# Patient Record
Sex: FEMALE | Race: WHITE | NOT HISPANIC OR LATINO | Employment: OTHER | URBAN - METROPOLITAN AREA
[De-identification: names, ages, dates, MRNs, and addresses within clinical notes are randomized per-mention and may not be internally consistent; named-entity substitution may affect disease eponyms.]

---

## 2017-08-15 ENCOUNTER — OFFICE VISIT (OUTPATIENT)
Dept: PHARMACY | Facility: CLINIC | Age: 82
End: 2017-08-15

## 2017-08-15 ENCOUNTER — OFFICE VISIT (OUTPATIENT)
Dept: FAMILY MEDICINE | Facility: CLINIC | Age: 82
End: 2017-08-15

## 2017-08-15 VITALS
BODY MASS INDEX: 23.25 KG/M2 | WEIGHT: 120 LBS | HEART RATE: 79 BPM | SYSTOLIC BLOOD PRESSURE: 120 MMHG | RESPIRATION RATE: 18 BRPM | TEMPERATURE: 97.4 F | OXYGEN SATURATION: 98 % | DIASTOLIC BLOOD PRESSURE: 78 MMHG

## 2017-08-15 DIAGNOSIS — I10 ESSENTIAL HYPERTENSION WITH GOAL BLOOD PRESSURE LESS THAN 140/90: ICD-10-CM

## 2017-08-15 DIAGNOSIS — I10 ESSENTIAL HYPERTENSION WITH GOAL BLOOD PRESSURE LESS THAN 140/90: Primary | ICD-10-CM

## 2017-08-15 DIAGNOSIS — M54.50 CHRONIC MIDLINE LOW BACK PAIN WITHOUT SCIATICA: ICD-10-CM

## 2017-08-15 DIAGNOSIS — R25.2 CRAMP OF LIMB: ICD-10-CM

## 2017-08-15 DIAGNOSIS — G89.29 CHRONIC MIDLINE LOW BACK PAIN WITHOUT SCIATICA: ICD-10-CM

## 2017-08-15 DIAGNOSIS — Z00.00 MEDICARE ANNUAL WELLNESS VISIT, INITIAL: ICD-10-CM

## 2017-08-15 DIAGNOSIS — I71.40 ABDOMINAL AORTIC ANEURYSM (AAA) WITHOUT RUPTURE (H): ICD-10-CM

## 2017-08-15 DIAGNOSIS — Z00.00 PREVENTATIVE HEALTH CARE: ICD-10-CM

## 2017-08-15 DIAGNOSIS — M51.369 DDD (DEGENERATIVE DISC DISEASE), LUMBAR: Primary | ICD-10-CM

## 2017-08-15 PROBLEM — Z86.19 HISTORY OF SHINGLES: Status: ACTIVE | Noted: 2017-08-15

## 2017-08-15 LAB
ALBUMIN SERPL-MCNC: 4.3 MG/DL (ref 3.5–4.7)
ALP SERPL-CCNC: 105.7 U/L (ref 31.7–110.5)
ALT SERPL-CCNC: 19.7 U/L (ref 0–45)
AST SERPL-CCNC: 24.9 U/L (ref 0–45)
BILIRUB SERPL-MCNC: 0.6 MG/DL (ref 0.2–1.3)
BUN SERPL-MCNC: 22.2 MG/DL (ref 7–19)
CALCIUM SERPL-MCNC: 10 MG/DL (ref 8.5–10.1)
CHLORIDE SERPLBLD-SCNC: 102.5 MMOL/L (ref 98–110)
CHOLEST SERPL-MCNC: 212.6 MG/DL (ref 0–200)
CHOLEST/HDLC SERPL: 4.2 {RATIO} (ref 0–5)
CK SERPL-CCNC: 73 U/L (ref 30–225)
CO2 SERPL-SCNC: 29.4 MMOL/L (ref 20–32)
CREAT SERPL-MCNC: 0.7 MG/DL (ref 0.5–1)
CREAT UR-MCNC: 50 MG/DL
FERRITIN SERPL-MCNC: 144 NG/ML (ref 8–252)
GFR SERPL CREATININE-BSD FRML MDRD: 85.6 ML/MIN/1.7 M2
GLUCOSE SERPL-MCNC: 104 MG'DL (ref 70–99)
HCT VFR BLD AUTO: 47.9 % (ref 35–47)
HDLC SERPL-MCNC: 51.1 MG/DL
HEMOGLOBIN: 15.3 G/DL (ref 11.7–15.7)
LDLC SERPL CALC-MCNC: 137 MG/DL (ref 0–129)
MCH RBC QN AUTO: 32.2 PG (ref 26.5–35)
MCHC RBC AUTO-ENTMCNC: 31.9 G/DL (ref 32–36)
MCV RBC AUTO: 100.8 FL (ref 78–100)
MICROALBUMIN UR-MCNC: 27 MG/L
MICROALBUMIN/CREAT UR: 53.8 MG/G CR (ref 0–25)
PLATELET # BLD AUTO: 249 K/UL (ref 150–450)
POTASSIUM SERPL-SCNC: 4.3 MMOL/DL (ref 3.3–4.5)
PROT SERPL-MCNC: 7.4 G/DL (ref 6.8–8.8)
RBC # BLD AUTO: 4.75 M/UL (ref 3.8–5.2)
SODIUM SERPL-SCNC: 138.4 MMOL/L (ref 132.6–141.4)
TRIGL SERPL-MCNC: 121.3 MG/DL (ref 0–150)
TSH SERPL DL<=0.005 MIU/L-ACNC: 2.08 MU/L (ref 0.4–4)
VLDL CHOLESTEROL: 24.3 MG/DL (ref 7–32)
WBC # BLD AUTO: 6.8 K/UL (ref 4–11)

## 2017-08-15 RX ORDER — AMLODIPINE BESYLATE 5 MG/1
5 TABLET ORAL DAILY
Qty: 90 TABLET | Refills: 2 | Status: SHIPPED | OUTPATIENT
Start: 2017-08-15 | End: 2018-05-14

## 2017-08-15 RX ORDER — LIDOCAINE 40 MG/G
CREAM TOPICAL
Qty: 1 TUBE | Refills: 0 | Status: SHIPPED | OUTPATIENT
Start: 2017-08-15 | End: 2018-08-29

## 2017-08-15 RX ORDER — METOPROLOL SUCCINATE 100 MG/1
100 TABLET, EXTENDED RELEASE ORAL DAILY
Qty: 90 TABLET | Refills: 2 | Status: SHIPPED | OUTPATIENT
Start: 2017-08-15 | End: 2018-05-14

## 2017-08-15 RX ORDER — LOSARTAN POTASSIUM 50 MG/1
50 TABLET ORAL DAILY
Qty: 90 TABLET | Refills: 2 | Status: SHIPPED | OUTPATIENT
Start: 2017-08-15 | End: 2018-05-14

## 2017-08-15 NOTE — PATIENT INSTRUCTIONS
Here is the plan from today's visit: Medicare annual wellness visit, initial    1. DDD (degenerative disc disease), lumbar,  Chronic midline low back pain without sciatica    - lidocaine (LMX4) 4 % CREA cream; Apply to the affected area topically as needed, 30 minutes before the upcoming procedure      2. Essential hypertension with goal blood pressure less than 140/90    - Albumin Random Urine Quantitative  - amLODIPine (NORVASC) 5 MG tablet; Take 1 tablet (5 mg) by mouth daily - OFFER BPGAP  Dispense:   - metoprolol (TOPROL-XL) 100 MG 24 hr tablet; Take 1 tablet (100 mg) by mouth daily - OFFER BPGAP   - losartan (COZAAR) 50 MG tablet; Take 1 tablet (50 mg) by mouth daily - OFFER BPGAP      3. Cramp of limb    - Comprehensive Metabolic Panel ([facility])  - CK total  - TSH with free T4 reflex  - CBC with Plt ([facility])  - Ferritin  - Lipid Panel ([facility])  - Vitamin D Level    4. Abdominal aortic aneurysm (AAA) without rupture (H)    - US Abdominal Aorta Limited; Future      Please call or return to clinic if your symptoms of rash don't go away.    Follow up plan  Please make a clinic appointment for follow up with your primary physician Marybeth Almazan in 1 Year for annual medicare visit.    Thank you for coming to [facility] today.  Lab Testing:  **If you had lab testing today and your results are reassuring or normal they will be mailed to you or sent through [email or web address] within 7 days.   **If the lab tests need quick action we will call you with the results.  The phone number we will call with results is # [phone] (home) . If this is not the best number please call our clinic and change the number.  Medication Refills:  If you need any refills please call your pharmacy and they will contact us.   If you need to  your refill at a new pharmacy, please contact the new pharmacy directly. The new pharmacy will help you get your medications transferred faster.   Scheduling:  If you have any concerns about today's  visit or wish to schedule another appointment please call our office during normal business hours [phone] (8-5:00 M-F)  If a referral was made to a [facility] and you don't get a call from central scheduling please call [phone].  If a Mammogram was ordered for you at [facility] call [phone] to schedule or change your appointment.  If you had an XRay/CT/Ultrasound/MRI ordered the number is [phone] to schedule or change your radiology appointment.   Medical Concerns:  If you have urgent medical concerns please call [phone] at any time of the day.  If you have a medical emergency please call [phone].        [facility] MEDICARE PERSONAL PREVENTIVE SERVICES PLAN - IMMUNIZATIONS     Here are your recommended immunizations.  Take this home for your reference.                                                    IMMUNIZATIONS Description Recommend today?     Influenza (Flu shot) Prevents flu; should get every year No: is not indicated today.   PCV 13 Pneumonia vaccination; you get it once No; is up to date.   PPSV 23 Second pneumonia vaccination; usually get it 1 year after PCV 13 No; is up to date.   Zoster (Shingles) Prevents shingles; you get it once Yes; Recommended and ordered.   Tetanus Prevents tetanus; once every 10 years No; is up to date.

## 2017-08-15 NOTE — PROGRESS NOTES
Subsequent Medicare Wellness Visit         HPI         This 81 year old female presents as an established patient  Marybeth Almazan who presents for a SUbsequent Medicare Annual Wellness Exam.    Other issues patient wants to be addressed today:    yes, rash from lidoderm patch        Patient Active Problem List   Diagnosis     DDD (degenerative disc disease), lumbar     Diverticulosis of colon     Advanced directives, counseling/discussion     Abdominal aortic aneurysm (H)     Hyperlipidemia LDL goal <130     Tobacco abuse     Calculus of kidney     Seborrheic keratosis     Rotator cuff strain     Right shoulder pain     Essential hypertension with goal blood pressure less than 140/90     Osteopenia     Balance problems       Past Medical History:   Diagnosis Date     Hypertension      NO ACTIVE PROBLEMS         Family History   Problem Relation Age of Onset     CEREBROVASCULAR DISEASE Mother      Breast Cancer Mother      Hypertension Mother      Arthritis Mother      Eye Disorder Mother      Thyroid Disease Mother      Genitourinary Problems Father      kidney     DIABETES Father      CANCER Father          Problem List, Family History and past Medical History reviewed and unchanged/updated.       Health Risk Assessment / Review of Systems       Constitutional:   Fevers or night sweats?  NO      Eyes:   Vision problems?  NO            Hearing:  Do you feel you have hearing loss?  NO  Cardiovascular:  Chest pain, palpitations, or pain with walking?  NO        Respiratory:   Breathing problems or cough?  NO    :   Difficulty controlling urination?  NO        Musculoskeletal:   Stiffness or sore joints?  NO            Skin:   concerning lesions or moles?  NO           Nervous System:   loss of strength or sensation,  numbness or tingling,  tremor,  dizziness,  headache?  NO         Mental Health:   depression or anxiety,  sleep problems?  NO   Cognition:  Memory problems?  NO           PHQ-2 Score:     PHQ-2 ( 1999  [employer]) 11/1/2016 6/15/2016   Q1: Little interest or pleasure in doing things 0 0   Q2: Feeling down, depressed or hopeless 0 0   PHQ-2 Score 0 0       PHQ-9 Score:     No flowsheet data found.         Medical Care     What other specialists or organizations are involved in your medical care?  None  Patient Care Team       Relationship Specialty Notifications Start End    Marybeth Almazan MD PCP - General Family Practice  11/1/16     Phone: [phone] Fax: [phone]         [address] [place] [address]    Marybeth Almazan MD MD Family Practice  11/2/16     Comment:  referred to PT for balance    Phone: [phone] Fax: [phone]         [address] [address]                 Social History / Home Safety     Social History   Substance Use Topics     Smoking status: Current Every Day Smoker     Packs/day: 0.25     Years: 50.00     Types: Cigarettes     Smokeless tobacco: Never Used     Alcohol use No     Marital Status:  Who lives in your household? alone  Do you feel threatened or controlled by a partner, ex-partner or anyone in your life? No   Has anyone hurt you physically, for example by pushing, hitting, slapping or kicking you   or forcing you to have sex? No          Functional Status     Do you need help with dressing yourself, bathing, or walking?No     Do you need help with the phone, transportation, shopping, preparing meals, housework, laundry, medications or managing money?No       Risk Behaviors and Healthy Habits     History   Smoking Status     Current Every Day Smoker     Packs/day: 0.25     Years: 50.00     Types: Cigarettes   Smokeless Tobacco     Never Used     How many servings of fruits and vegetables do you eat a day? 1-2  Exercise:walking  6-7 days/week for an average of 45-60 minutes  Do you frequently drive without a seatbelt? No   Do you use any other drugs? No       Do you use alcohol?No      Functional Ability     FALL RISK  "ASSESSMENT 6/15/2016 6/5/2015 4/9/2014 3/29/2013   Fallen 2 or more times in the past year? - 2. No 2. No 2. No   Any fall with injury in the past year? - 2. No 2. No 2. No   Get Up and Go Test/Seconds (>13.5 is fall risk; contact physician) - - 0 -   Medical Reason(s) for Not Completing Screen: - - none -   Fallen 2 or more times in the past year? No - - -   Any fall with injury in the past year? No - - -       Frailty screen score (3-5 = frail; consider interdisciplinary assessment and care.  1-2 = prefrail; at risk for adverse health events; 0 = robust):        EVALUATION OF COGNITIVE FUNCTION     Mood/affect:Normal  Appearance:Normal  Family member/caregiver input: Normal    Mini Cog Scoring   2 points   Clock Draw Test result:  Normal   Cognitive screen is:Negative          Other Assessments     CV Risk based on Pooled Cohort Risk (consider assessing every 4-6 years; consider statin in patients with 10-yr risk > 7.5%): {may use \"SMIASCVD\" if guideline information required}  The ASCVD Risk score (Azambrittney MONTGOMERY Jr, et al., 2013) failed to calculate for the following reasons:    The 2013 ASCVD risk score is only valid for ages 40 to 79    Advance Directives: Discussed with patient and family as appropriate.  Has patient completed advance directives and/or a living will?  yes   {Use section \"Code/ACP Activity\" under More activities for Advance directive information-use  for code status}  Immunization History   Administered Date(s) Administered     Influenza (IIV3) 11/18/2008, 09/27/2012     Influenza Vaccine IM 3yrs+ 4 Valent IIV4 09/01/2013     Pneumococcal (PCV 13) 12/04/2015     Pneumococcal 23 valent 10/15/1999, 11/14/2001     Poliovirus, inactivated (IPV) 02/02/2012     TD (ADULT, 7+) 11/18/2008     Twinrix A/B 02/02/2012, 03/05/2012     Typhoid IM 02/02/2012     Reviewed Immunization Record Today         Physical Exam     Vitals: /78  Pulse 79  Temp 97.4  F (36.3  C) (Oral)  Resp 18  Wt 120 lb " "(54.4 kg)  SpO2 98%  Breastfeeding? No  BMI 23.25 kg/m2  BMI= Body mass index is 23.25 kg/(m^2).  GENERAL APPEARANCE: alert and no distress  HENT: ear canals patent and TM's normal; mouth without ulcers or lesions; and oral mucous membranes moist  DENTITION:  Good repair?  yes  RESP: lungs clear to auscultation - no rales, rhonchi or wheezes  CV: regular rates and rhythm, no murmur, click or rub, no peripheral edema and peripheral pulses strong  SKIN: no suspicious lesions or rashes  NEURO: Normal strength and tone  {use \".PHYEXAMBYAGE\" FOR FULL NOTEWRITER EXAM}        Assessment and Plan   {Go to orders and enter \"Medicare Annual Wellness Visit, Subsequent\" for visit diagnosis and order \"Medical Wellness Panel\". Check/ uncheck all appropriate orders}  {Go to billing and bill code .  If you addressed other concerns you can also bill an E&M code for those services}    Subsequent Medicare Annual Wellness Exam - reviewed Preventive Services and Plan form with patient as specified in Patient Instructions.    {Go to Instructions, type in dot phrase \".SMIAVSMEDICAREMALE\" for male pt or \".SMIAVSMEDICAREFEMALE\" for female pt and complete Personal Prevention Plan with patient}      Options for treatment and follow-up care were reviewed with the Isabell Cisse and/or guardian engaged in the decision making process and verbalized understanding of the options discussed and agreed with the final plan.    Marybeth Almazan MD  "

## 2017-08-15 NOTE — LETTER
August 18, 2017      Isabell Cisse  [address]  [facility] [address]        Dear Isabell,    Thank you for getting your care at [facility]. Please see below for your test results.  There is nothing in these results that explains why you might be having leg cramps at times. Please increase your water intake and come back to see me if this problem continues.     The only other thing we might consider is the addition of a cholesterol lowering medication, but as I recall you weren't interested in this. I'll bring it up again when we see each other again!    Resulted Orders   Comprehensive Metabolic Panel ([facility])   Result Value Ref Range    Albumin 4.3 3.5 - 4.7 mg/dL    Alkaline Phosphatase 105.7 31.7 - 110.5 U/L    ALT 19.7 0.0 - 45.0 U/L    AST 24.9 0.0 - 45.0 U/L    Bilirubin Total 0.6 0.2 - 1.3 mg/dL    Urea Nitrogen 22.2 (H) 7.0 - 19.0 mg/dL    Calcium 10.0 8.5 - 10.1 mg/dL    Chloride 102.5 98.0 - 110.0 mmol/L    Carbon Dioxide 29.4 20.0 - 32.0 mmol/L    Creatinine 0.7 0.5 - 1.0 mg/dL    Glucose 104.0 (H) 70.0 - 99.0 mg'dL    Potassium 4.3 3.3 - 4.5 mmol/dL    Sodium 138.4 132.6 - 141.4 mmol/L    Protein Total 7.4 6.8 - 8.8 g/dL    GFR Estimate 85.6 >60.0 mL/min/1.7 m2    GFR Estimate If Black >90 >60.0 mL/min/1.7 m2   CK total   Result Value Ref Range    CK Total 73 30 - 225 U/L   TSH with free T4 reflex   Result Value Ref Range    TSH 2.08 0.40 - 4.00 mU/L   CBC with Plt ([facility])   Result Value Ref Range    WBC 6.8 4.0 - 11.0 K/uL    RBC 4.75 3.80 - 5.20 M/uL    Hemoglobin 15.3 11.7 - 15.7 g/dL    Hematocrit 47.9 (H) 35.0 - 47.0 %    .8 (H) 78.0 - 100.0 fL    MCH 32.2 26.5 - 35.0 pg    MCHC 31.9 (L) 32.0 - 36.0 g/dL    Platelets 249.0 150.0 - 450.0 K/uL   Ferritin   Result Value Ref Range    Ferritin 144 8 - 252 ng/mL   Lipid Panel ([facility])   Result Value Ref Range    Cholesterol 212.6 (H) 0.0 - 200.0 mg/dL    Cholesterol/HDL Ratio 4.2 0.0 - 5.0    HDL Cholesterol 51.1 >40.0 mg/dL    LDL  Cholesterol Calculated 137 (H) 0 - 129 mg/dL    Triglycerides 121.3 0.0 - 150.0 mg/dL    VLDL Cholesterol 24.3 7.0 - 32.0 mg/dL   Albumin Random Urine Quantitative   Result Value Ref Range    Creatinine Urine 50 mg/dL    Albumin Urine mg/L 27 mg/L    Albumin Urine mg/g Cr 53.80 (H) 0 - 25 mg/g Cr   Vitamin D Level   Result Value Ref Range    Vitamin D Deficiency screening 47 20 - 75 ug/L      Comment:      Season, race, dietary intake, and treatment affect the concentration of   25-hydroxy-Vitamin D. Values may decrease during winter months and increase   during summer months. Values 20-29 ug/L may indicate Vitamin D insufficiency   and values <20 ug/L may indicate Vitamin D deficiency.  Vitamin D determination is routinely performed by an immunoassay specific for   25 hydroxyvitamin D3.  If an individual is on vitamin D2 (ergocalciferol)   supplementation, please specify 25 OH vitamin D2 and D3 level determination by   LCMSMS test [id number].         If you have any concerns about these results please call and leave a message for me or send a MyChart message to the clinic.    Sincerely,    Marybeth Almazan MD

## 2017-08-15 NOTE — PROGRESS NOTES
Initial Medicare Wellness Visit         HPI       This 81 year old female presents as an established patient  Marybeth Almazan who presents for a Welcome to Medicare Preventive Visit / Initial Medicare Annual Wellness Exam.    Other issues patient wants to be addressed today:    yes, rash from OTC pain med patch she uses on her lower back and upper R arm. Has always had sensitive skin. Would like another topical option. Had TENS unit for back in past that was too irritating as well.      Visual Acuity:  Right Eye: 20/30   Left Eye: 20/30  Both Eyes: 20/20    Patient Active Problem List   Diagnosis     DDD (degenerative disc disease), lumbar     Diverticulosis of colon     Advanced directives, counseling/discussion     Abdominal aortic aneurysm (H)     Hyperlipidemia LDL goal <130     Tobacco abuse     Calculus of kidney     Seborrheic keratosis     Rotator cuff strain     Right shoulder pain     Essential hypertension with goal blood pressure less than 140/90     Osteopenia     Balance problems       Past Medical History:   Diagnosis Date     Hypertension      NO ACTIVE PROBLEMS         Family History   Problem Relation Age of Onset     CEREBROVASCULAR DISEASE Mother      Breast Cancer Mother      Hypertension Mother      Arthritis Mother      Eye Disorder Mother      Thyroid Disease Mother      Genitourinary Problems Father      kidney     DIABETES Father      CANCER Father          Problem List, Family History and past Medical History reviewed and unchanged/updated.       Health Risk Assessment / Review of Systems       Constitutional:   Fevers or night sweats?  NO      Eyes:   Vision problems?  NO            Hearing:  Do you feel you have hearing loss?  NO, but sometimes have to ask others to repeat themselves  Cardiovascular:  Chest pain, palpitations, or pain with walking?  NO        Respiratory:   Breathing problems or cough?  NO    :   Difficulty controlling urination?  NO        Musculoskeletal:   Stiffness  or sore joints?  NO            Skin:   concerning lesions or moles?  YES, have rash on back and right bicep      Nervous System:   loss of strength or sensation,  numbness or tingling,  tremor,  dizziness,  headache?  NO         Mental Health:   depression or anxiety,  sleep problems?  NO   Cognition:  Memory problems?  NO           PHQ-2 Score:     PHQ-2 ( 1999 Pfizer) 8/15/2017 11/1/2016   Q1: Little interest or pleasure in doing things 0 0   Q2: Feeling down, depressed or hopeless 0 0   PHQ-2 Score 0 0       PHQ-9 Score: not done    No flowsheet data found.         Medical Care     What other specialists or organizations are involved in your medical care?  none  Patient Care Team       Relationship Specialty Notifications Start End    Marybeth Almazan MD PCP - General Family Practice  11/1/16     Phone: [phone] Fax: [phone]         [address] [place] [id number]    Marybeth Almazan MD [facility]  11/2/16     Comment:  referred to PT for balance    Phone: [phone] Fax: [phone]         [address] [place] [address]                 Social History / Home Safety     Social History   Substance Use Topics     Smoking status: Current Every Day Smoker     Packs/day: 0.25     Years: 50.00     Types: Cigarettes     Smokeless tobacco: Never Used     Alcohol use No     Marital Status:  Who lives in your household? No one  Does your home have any of the following safety concerns? Loose rugs in the hallway, no grab bars in the bathroom, no handrails on the stairs or have poorly lit areas?  No   Do you feel threatened or controlled by a partner, ex-partner or anyone in your life? {Yes No   Has anyone hurt you physically, for example by pushing, hitting, slapping or kicking you   or forcing you to have sex? No          Functional Status     Do you need help with dressing yourself, bathing, or walking?No     Do you need help with the phone, transportation,  shopping, preparing meals, housework, laundry, medications or managing money?No       Risk Behaviors and Healthy Habits     History   Smoking Status     Current Every Day Smoker     Packs/day: 0.25     Years: 50.00     Types: Cigarettes   Smokeless Tobacco     Never Used     How many servings of fruits and vegetables do you eat a day? 1-2  Exercise: walking  6-7 days/week for an average of greater than 60 minutes  Do you frequently drive without a seatbelt? No   Do you use any other drugs? No       Do you use alcohol?No      Functional Ability     Was the patient's timed Up & Go test  unsteady or longer than 30 seconds? No     Fall risk:     1. Have you fallen two or more times in the past year? No   2. Have you fallen and had an injury in the past year?  No     FALL RISK ASSESSMENT 8/15/2017 6/15/2016 6/5/2015 4/9/2014 3/29/2013   Fallen 2 or more times in the past year? - - 2. No 2. No 2. No   Any fall with injury in the past year? - - 2. No 2. No 2. No   Get Up and Go Test/Seconds (>13.5 is fall risk; contact physician) - - - 0 -   Medical Reason(s) for Not Completing Screen: - - - none -   Fallen 2 or more times in the past year? No No - - -   Any fall with injury in the past year? No No - - -       Hearing loss screen:   Negative       Frailty screen score (3-5 = frail; consider interdisciplinary assessment and care.  1-2 = prefrail; at risk for adverse health events; 0 = robust):        EVALUATION OF COGNITIVE FUNCTION     Mood/affect:Normal  Appearance:Normal  Family member/caregiver input: Normal    Mini Cog Scoring   3 points   Clock Draw Test result:  Normal   Cognitive screen is: Negative          Other Assessments     CV Risk based on Pooled Cohort Risk (consider assessing every 4-6 years; consider statin in patients with 10-yr risk > 7.5%):   The ASCVD Risk score (Glennvillebrittney MONTGOMERY Jr, et al., 2013) failed to calculate for the following reasons:    The 2013 ASCVD risk score is only valid for ages 40 to  79      Advance Directives: Discussed with patient and family as appropriate.  Has patient completed advance directives and/or a living will?  yes     Immunization History   Administered Date(s) Administered     Influenza (IIV3) 11/18/2008, 09/27/2012     Influenza Vaccine IM 3yrs+ 4 Valent IIV4 09/01/2013     Pneumococcal (PCV 13) 12/04/2015     Pneumococcal 23 valent 10/15/1999, 11/14/2001     Poliovirus, inactivated (IPV) 02/02/2012     TD (ADULT, 7+) 11/18/2008     Twinrix A/B 02/02/2012, 03/05/2012     Typhoid IM 02/02/2012     Reviewed Immunization Record Today         Physical Exam     Vitals: /78  Pulse 79  Temp 97.4  F (36.3  C) (Oral)  Resp 18  Wt 120 lb (54.4 kg)  SpO2 98%  Breastfeeding? No  BMI 23.25 kg/m2  BMI= Body mass index is 23.25 kg/(m^2).         Physical Exam   Constitutional: She appears well-developed and well-nourished. No distress.   HENT:   Right Ear: External ear normal.   Left Ear: External ear normal.   Mouth/Throat: Oropharynx is clear and moist.   Full upper and partial lower dentures   Eyes: Conjunctivae and EOM are normal. Pupils are equal, round, and reactive to light.   Neck: Normal range of motion. Neck supple. No JVD present. No thyromegaly present.   No carotid bruits   Cardiovascular: Normal rate, regular rhythm and normal heart sounds.  Exam reveals no gallop and no friction rub.    No murmur heard.  Pulmonary/Chest: Effort normal and breath sounds normal. No respiratory distress. She has no wheezes. She has no rales.   Abdominal: Soft. Bowel sounds are normal. She exhibits abdominal bruit. She exhibits no distension and no mass. There is no hepatosplenomegaly. There is no tenderness. There is no rebound.       Musculoskeletal: She exhibits no edema.        Lumbar back: She exhibits decreased range of motion and tenderness. She exhibits no swelling and no spasm.   Lymphadenopathy:     She has no cervical adenopathy.             Assessment and Plan     Welcome to  Medicare Preventive Visit / Initial Medicare Annual Wellness Exam - reviewed Preventive Services and Plan form with patient as specified in Patient Instructions.    1. DDD (degenerative disc disease), lumbar,  Chronic midline low back pain without sciatica  - lidocaine (LMX4) 4 % CREA cream; Apply to the affected area topically as needed, 30 minutes before the upcoming procedure      2. Essential hypertension with goal blood pressure less than 140/90  - Albumin Random Urine Quantitative  - amLODIPine (NORVASC) 5 MG tablet; Take 1 tablet (5 mg) by mouth daily - OFFER BPGAP  Dispense:   - metoprolol (TOPROL-XL) 100 MG 24 hr tablet; Take 1 tablet (100 mg) by mouth daily - OFFER BPGAP   - losartan (COZAAR) 50 MG tablet; Take 1 tablet (50 mg) by mouth daily - OFFER BPGAP      3. Cramp of limb  - Comprehensive Metabolic Panel ([facility])  - CK total  - TSH with free T4 reflex  - CBC with Plt ([facility]Q)  - Ferritin  - Lipid Panel ([facility])  - Vitamin D Level    4. Abdominal aortic aneurysm (AAA) without rupture (H). Follow up imaging  - US Abdominal Aorta Limited; Future    5. Osteopenia  -  Continue Ca + D    [facility] MEDICARE PERSONAL PREVENTIVE SERVICES PLAN - IMMUNIZATIONS     Here are your recommended immunizations.  Take this home for your reference.                                                    IMMUNIZATIONS Description Recommend today?     Influenza (Flu shot) Prevents flu; should get every year No: is not indicated today.   PCV 13 Pneumonia vaccination; you get it once No; is up to date.   PPSV 23 Second pneumonia vaccination; usually get it 1 year after PCV 13 No; is up to date.   Zoster (Shingles) Prevents shingles; you get it once Yes; Recommended and ordered.   Tetanus Prevents tetanus; once every 10 years No; is up to date.       Options for treatment and follow-up care were reviewed with the Isabell Cisse and/or guardian engaged in the decision making process and verbalized understanding of the options  discussed and agreed with the final plan.    Marybeth Almazan MD

## 2017-08-15 NOTE — MR AVS SNAPSHOT
After Visit Summary   8/15/2017    Isabell Cisse    MRN: [id number]           Patient Information     Date Of Birth          1935        Visit Information        Provider Department      8/15/2017 8:40 AM Marybeth Almazan MD [facility]        Today's Diagnoses     DDD (degenerative disc disease), lumbar    -  1    Medicare annual wellness visit, initial        Essential hypertension with goal blood pressure less than 140/90        Cramp of limb        Abdominal aortic aneurysm (AAA) without rupture (H)        Chronic midline low back pain without sciatica          Care Instructions    Here is the plan from today's visit: Medicare annual wellness visit, initial    1. DDD (degenerative disc disease), lumbar,  Chronic midline low back pain without sciatica    - lidocaine (LMX4) 4 % CREA cream; Apply to the affected area topically as needed, 30 minutes before the upcoming procedure      2. Essential hypertension with goal blood pressure less than 140/90    - Albumin Random Urine Quantitative  - amLODIPine (NORVASC) 5 MG tablet; Take 1 tablet (5 mg) by mouth daily - OFFER BPGAP  Dispense:   - metoprolol (TOPROL-XL) 100 MG 24 hr tablet; Take 1 tablet (100 mg) by mouth daily - OFFER BPGAP   - losartan (COZAAR) 50 MG tablet; Take 1 tablet (50 mg) by mouth daily - OFFER BPGAP      3. Cramp of limb    - Comprehensive Metabolic Panel (LabDAQ)  - CK total  - TSH with free T4 reflex  - CBC with Plt (LabDAQ)  - Ferritin  - Lipid Panel (LabDAQ)  - Vitamin D Level    4. Abdominal aortic aneurysm (AAA) without rupture (H)    - US Abdominal Aorta Limited; Future      Please call or return to clinic if your symptoms of rash don't go away.    Follow up plan  Please make a clinic appointment for follow up with your primary physician Marybeth Almazan in 1 Year for annual medicare visit.    Thank you for coming to [facility] today.  Lab Testing:  **If you had lab testing today and your results  are reassuring or normal they will be mailed to you or sent through [employer] within 7 days.   **If the lab tests need quick action we will call you with the results.  The phone number we will call with results is # [phone] (home) . If this is not the best number please call our clinic and change the number.  Medication Refills:  If you need any refills please call your pharmacy and they will contact us.   If you need to  your refill at a new pharmacy, please contact the new pharmacy directly. The new pharmacy will help you get your medications transferred faster.   Scheduling:  If you have any concerns about today's visit or wish to schedule another appointment please call our office during normal business hours [phone] (8-5:00 M-F)  If a referral was made to a [facility] and you don't get a call from central scheduling please call [phone].  If a Mammogram was ordered for you at [facility] call [phone] to schedule or change your appointment.  If you had an XRay/CT/Ultrasound/MRI ordered the number is [phone] to schedule or change your radiology appointment.   Medical Concerns:  If you have urgent medical concerns please call [phone] at any time of the day.  If you have a medical emergency please call 492.        [facility] MEDICARE PERSONAL PREVENTIVE SERVICES PLAN - IMMUNIZATIONS     Here are your recommended immunizations.  Take this home for your reference.                                                    IMMUNIZATIONS Description Recommend today?     Influenza (Flu shot) Prevents flu; should get every year No: is not indicated today.   PCV 13 Pneumonia vaccination; you get it once No; is up to date.   PPSV 23 Second pneumonia vaccination; usually get it 1 year after PCV 13 No; is up to date.   Zoster (Shingles) Prevents shingles; you get it once Yes; Recommended and ordered.   Tetanus Prevents tetanus; once every 10 years No; is up to date.              Follow-ups after your visit        Your next 10 appointments already scheduled     Aug 23, 2017  8:00 AM CDT   US ABDOMINAL AORTIC LIMITED with UCUS3   [facility] ([facility])    [address] [address] [phone]           Please bring a list of your medicines (including vitamins, minerals and over-the-counter drugs). Also, tell your doctor about any allergies you may have. Wear comfortable clothes and leave your valuables at home.  Adults: No eating or drinking for 8 hours before the exam. You may take medicine with a small sip of water.  Children: - Children 6+ years: No food or drink for 6 hours before exam. - Children 1-5 years: No food or drink for 4 hours before exam. - Infants, breast-fed: may have breast milk up to 2 hours before exam. - Infants, formula: may have bottle until 4 hours before exam.  Please call the Imaging Department at your exam site with any questions.              Future tests that were ordered for you today     Open Future Orders        Priority Expected Expires Ordered    US Abdominal Aorta Limited Routine  8/15/2018 8/15/2017            Who to contact     Please call your clinic at [phone] to:    Ask questions about your health    Make or cancel appointments    Discuss your medicines    Learn about your test results    Speak to your doctor   If you have compliments or concerns about an experience at your clinic, or if you wish to file a complaint, please contact [facility] Patient Relations at [phone] or email us at [email or web address]         Additional Information About Your Visit        [employer] Information     [employer] is an electronic gateway that provides easy, online access to your medical records. With [employer], you can request a clinic appointment, read your test results, renew a prescription or communicate with your care team.     To sign  up for [employer] visit the website at [email or web address]   You will be asked to enter the access code listed below, as well as some personal information. Please follow the directions to create your username and password.     Your access code is: [email or web address]  Expires: 2017  9:35 AM     Your access code will  in 90 days. If you need help or a new code, please contact your [facility] or call [phone] for assistance.        Care EveryWhere ID     This is your Care EveryWhere ID. This could be used by other organizations to access your [place] medical records  [id number]        Your Vitals Were     Pulse Temperature Respirations Pulse Oximetry Breastfeeding? BMI (Body Mass Index)    79 97.4  F (36.3  C) (Oral) 18 98% No 23.25 kg/m2       Blood Pressure from Last 3 Encounters:   08/15/17 120/78   16 130/87   16 114/58    Weight from Last 3 Encounters:   08/15/17 120 lb (54.4 kg)   16 120 lb 3.2 oz (54.5 kg)   06/15/16 117 lb (53.1 kg)              We Performed the Following     Albumin Random Urine Quantitative     CBC with Plt (LabDAQ)     CK total     Comprehensive Metabolic Panel (LabDAQ)     Ferritin     Lipid Panel (LabDAQ)     TSH with free T4 reflex     Vitamin D Level          Today's Medication Changes          These changes are accurate as of: 8/15/17  9:35 AM.  If you have any questions, ask your nurse or doctor.               Start taking these medicines.        Dose/Directions    lidocaine 4 % Crea cream   Commonly known as:  LMX4   Used for:  DDD (degenerative disc disease), lumbar   Started by:  Marybeth Almazan MD        Apply to the affected area topically as needed, 30 minutes before the upcoming procedure   Quantity:  1 Tube   Refills:  0            Where to get your medicines      These medications were sent to [facility] [address]  [address]     Phone:  [phone]     amLODIPine 5 MG tablet    lidocaine 4 % Crea cream    losartan 50 MG tablet    metoprolol 100 MG 24 hr tablet                Primary Care Provider Office Phone # Fax #    Marybeth Almazan -[phone]       [address] [facility] [address]  [facility] [address]        Equal Access to Services     [facility]: Hadii aad ku hadasho Soomaali, waaxda luqadaha, qaybta kaalmada adeegyada, waxay idiin hayaan adeeg nati lababaktien . So [facility] [phone].    ATENCIÓN: Si habla español, tiene a perrin disposición servicios gratuitos de asistencia lingüística. Ivy al [phone].    We comply with applicable federal civil rights laws and [place] laws. We do not discriminate on the basis of race, color, national origin, age, disability sex, sexual orientation or gender identity.            Thank you!     Thank you for choosing hospitals [facility]  for your care. Our goal is always to provide you with excellent care. Hearing back from our patients is one way we can continue to improve our services. Please take a few minutes to complete the written survey that you may receive in the mail after your visit with us. Thank you!             Your Updated Medication List - Protect others around you: Learn how to safely use, store and throw away your medicines at www.disposemymeds.org.          This list is accurate as of: 8/15/17  9:35 AM.  Always use your most recent med list.                   Brand Name Dispense Instructions for use Diagnosis    amLODIPine 5 MG tablet    NORVASC    90 tablet    Take 1 tablet (5 mg) by mouth daily - OFFER BPGAP    Essential hypertension with goal blood pressure less than 140/90       aspirin 81 MG tablet      Take 1 tablet by mouth Patient takes occasionally        CALCIUM 500 PO      1 tablet daily    Mammographic microcalcification, Preventative health care       lidocaine 4 % Crea cream    LMX4    1 Tube    Apply to the affected area  topically as needed, 30 minutes before the upcoming procedure    DDD (degenerative disc disease), lumbar       losartan 50 MG tablet    COZAAR    90 tablet    Take 1 tablet (50 mg) by mouth daily - OFFER BPGAP    Essential hypertension with goal blood pressure less than 140/90       MAGNESIUM PO           metoprolol 100 MG 24 hr tablet    TOPROL-XL    90 tablet    Take 1 tablet (100 mg) by mouth daily - OFFER BPGAP    Essential hypertension with goal blood pressure less than 140/90       Multi-vitamin Tabs tablet   Generic drug:  multivitamin, therapeutic with minerals      1 TABLET DAILY        VITAMIN C PO      1 tablet daily    Mammographic microcalcification, Preventative health care       VITAMIN D PO      1 tablet daily    Mammographic microcalcification, Preventative health care       vitamin E 1000 UNIT capsule    TOCOPHEROL     Take 1,000 Units by mouth daily.    Preop general physical exam       ZINC 15 PO      Patient takes occasionally    Mammographic microcalcification, Preventative health care

## 2017-08-15 NOTE — PROGRESS NOTES
Clinical Pharmacy Note     Isabell was referred by Dr. Almazan for pharmacy services for Annual Medicare Wellness Visit.      MEDICATION REVIEW:  Discussed all medication indications, dosage and effectiveness, adverse effects, and adherence with patient/caregiver.    Pt had meds with them: no  Pt had med list with them: no  Pt was knowledgeable about meds: yes, knew that she takes 3 blood pressure meds, but unsure of medication names  Medications set up by: self  Medications administered by someone else (e.g., LTCF): No  Pt uses a medication box or automated dispenser: yes    Medication Discrepancies  Medications on EMR med list that pt is NOT taking:  yes, Calcium, patient states that she had kidney stones in the past and no longer takes Calcium.  Medications pt IS taking that are NOT on EMR med list (e.g., from specialist, hospital): none  OTC meds/ dietary supplements pt taking on own that are NOT on EMR med list:  yes, occasionally Vitamin B, occasionally L-lysine when she has shingles pains on her head and it helps.  Dosage listed differently than how patient is taking: none  Frequency listed differently than how patient is taking: none  Duplicate medication on list (two occurrences of the same medication):  none  TOTAL NUMBER OF MEDICATION DISCREPANCIES:  3  ______________________________________________________________________      Subjective:  Isabell is here today with her daughter for an MTM visit.    1)   Hypertension;     Isabell states that she takes 3 medications for her blood pressure daily, but she does not know the names of the medications.  She sets up her medications in a pill box which helps her with compliance.  She states that she usually remembers, but there was one day last week when she realized the next day she forgot her dose the day before.  She has no complaints of adverse effects.    2)   Bone Health;    Isabell states that she used to take a calcium supplement, but then she has a  kidney stone so then she stopped using it.  She states that she eats lots of cheese (three times daily) and milk, but is concerned about getting too much calcium in her diet and getting kidney stones again.  She had a DEXA scan years ago, but is not interested in another scan.  Isabell states that she has never fractured or broken a bone and her mother had strong bones, too.  She states that she is not taking her vitamin D regularly and asks about this medication.    Patient reported being compliant most of the time   Patient reports no side effects.      Objective:    There were no vitals taken for this visit.  BP Readings from Last 6 Encounters:   08/15/17 120/78   11/01/16 130/87   08/27/16 114/58   06/15/16 138/79   12/04/15 166/86   06/05/15 122/79     CrCl cannot be calculated (Patient's most recent sCr result is older than the maximum 90 days allowed.).  GFR Estimate   Date Value Ref Range Status   06/07/2016 83 >60 mL/min/1.7m2 Final     Comment:     Non  GFR Calc   06/01/2015 74 >60 mL/min/1.7m2 Final     Comment:     Non  GFR Calc   04/09/2014 81 >60 mL/min/1.7m2 Final     GFR Estimate If Black   Date Value Ref Range Status   06/07/2016 >90   GFR Calc   >60 mL/min/1.7m2 Final   06/01/2015 90 >60 mL/min/1.7m2 Final     Comment:      GFR Calc   04/09/2014 >90 >60 mL/min/1.7m2 Final       Drug Therapy Assessment:  Drug therapy problems identified:     1. Essential hypertension with goal blood pressure less than 140/90       Status:  controlled       DTP:  None    Patient BP in clinic today, 120/78, meeting BP goal of <140/90 with medication regimen of metoprolol, losartan and amlodipine.  Patient is able to be compliant with medications using her pill box.  Recommend continuing current therapy.    2. Bone Health       Status:  unknown       DTP:  Safety, recommend DEXA  , DTP degree:2        Status:  uncontrolled       DTP:  patient forgets to  take/misunderstanding, initiate Vitamin D  , DTP degree:2            Patient meets National Osteoporosis Foundation recommendation for bone mineral density testing, being a female >65 years of age.  Recommend DEXA scan to assess T-score and indication for osteoporosis or osteopenia therapy.  Patient declines DEXA scan.     Patient is currently having 3-4 servings of dietary dairy calcium/day ~1000 mg plus ~250 mg in non-dairy dietary sources.  Recommend continuing dietary intake of 1200 mg calcium to meet NOF guidelines.  We discussed that evidence has associated an increased risk of nephrolithiasis in patients taking calcium supplements, but the same risk has not been seen in patients with dietary calcium intake and the incidence of stone formation may be reduced.     Patient is not taking vitamin D supplement regularly.  Recommend taking 800-1000 IU vitamin D daily to aid in calcium intestinal absorption.      All medications were reviewed and found to be indicated, effective, safe and convenient unless drug therapy problem identified as described above.        Drug Therapy Plan and Follow up:      Plan  1. Essential hypertension with goal blood pressure less than 140/90  Continue current therapy.  2. Bone Health  Continue 1200 mg dietary calcium daily.  Start taking Vitamin D 800-1000 IU daily.    Follow up: with PCP  Patient was provided with written instructions/medication list via provider AVS.        Options for treatment and/or follow-up care were reviewed with the patient. Patient was engaged and actively involved in the decision making process.  Isabell Cisse verbalized understanding of the options discussed and was satisfied with the final plan.      Dr. Almazan was provided my action  via routed note and Dr. Ibrahim was available for supervision during this visit and is the authorizing prescriber for this visit through the pharmacist collaborative practice agreement.      Vanessa Barber, Pharm.D      Total Time: 20  # DTPs Identified: 2    Medical Condition 1: Osteoporosis, Goals of therapy: Other,  ,  ,  , Safety: Lab test needed,  , Intervention: Order lab, Verification: Patient Disagreed  Medical Condition 2: Osteoporosis,  , Drug Class 2: Vitamins,  ,  ,  , Convenience 2: Patient misunderstanding, Intervention 2: Initiate drug, Educate patient, Verification 2: Patient Agreed - CPA   ,  ,  ,  ,  ,  ,  ,  ,     ,  ,  ,

## 2017-08-16 LAB — DEPRECATED CALCIDIOL+CALCIFEROL SERPL-MC: 47 UG/L (ref 20–75)

## 2017-08-29 NOTE — PROGRESS NOTES
===================    Pharmacy Attestation Statement:    Patient s case reviewed. I agree with the written assessment and plan of care.    Darinel Perry PharmD.    ===================

## 2018-04-04 ENCOUNTER — OFFICE VISIT (OUTPATIENT)
Dept: FAMILY MEDICINE | Facility: CLINIC | Age: 83
End: 2018-04-04
Payer: COMMERCIAL

## 2018-04-04 VITALS
RESPIRATION RATE: 16 BRPM | SYSTOLIC BLOOD PRESSURE: 134 MMHG | BODY MASS INDEX: 23.64 KG/M2 | TEMPERATURE: 97.7 F | DIASTOLIC BLOOD PRESSURE: 81 MMHG | WEIGHT: 122 LBS | OXYGEN SATURATION: 96 % | HEART RATE: 76 BPM

## 2018-04-04 DIAGNOSIS — L23.1 ALLERGIC CONTACT DERMATITIS DUE TO ADHESIVES: Primary | ICD-10-CM

## 2018-04-04 DIAGNOSIS — M54.50 ACUTE RIGHT-SIDED LOW BACK PAIN WITHOUT SCIATICA: ICD-10-CM

## 2018-04-04 LAB
BILIRUBIN UR: NEGATIVE
BLOOD UR: NEGATIVE
GLUCOSE URINE: NEGATIVE
KETONES UR QL: NEGATIVE
LEUKOCYTE ESTERASE UR: NEGATIVE
NITRITE UR QL STRIP: NEGATIVE
PH UR STRIP: 5.5 [PH] (ref 5–7)
PROTEIN UR: NEGATIVE
SP GR UR STRIP: 1.01
UROBILINOGEN UR STRIP-ACNC: NORMAL

## 2018-04-04 RX ORDER — TRIAMCINOLONE ACETONIDE 1 MG/G
OINTMENT TOPICAL
Qty: 80 G | Refills: 0 | Status: SHIPPED | OUTPATIENT
Start: 2018-04-04 | End: 2018-08-29

## 2018-04-04 ASSESSMENT — ENCOUNTER SYMPTOMS
DIARRHEA: 0
PALPITATIONS: 0
NAUSEA: 0
HEADACHES: 0
DIZZINESS: 0
ABDOMINAL PAIN: 0
LIGHT-HEADEDNESS: 0
DYSURIA: 1
CONSTIPATION: 0
FREQUENCY: 1
RHINORRHEA: 0
APPETITE CHANGE: 0
COUGH: 0
ARTHRALGIAS: 0
VOMITING: 0
EYES NEGATIVE: 1
BLOOD IN STOOL: 0
SHORTNESS OF BREATH: 0
CHILLS: 0
BACK PAIN: 1
HEMATURIA: 0
SORE THROAT: 0
FEVER: 0

## 2018-04-04 NOTE — PATIENT INSTRUCTIONS
Here is the plan from today's visit    1. Allergic contact dermatitis due to adhesives    - triamcinolone (KENALOG) 0.1 % ointment; Apply sparingly to affected area three times daily for 14 days.  Dispense: 80 g; Refill: 0    2. Acute right-sided low back pain without sciatica  Take advil 600mg every 8 hours as needed for pain, take acetaminophen 1000mg every 8 hours as needed for pain  Ice or heat, which ever is preferred.  Do not use tens unit due to allergic reaction from adhesive.  Follow up for worsening symptoms or fever > 101.        Please call or return to clinic if your symptoms don't go away.    Follow up plan  Please make a clinic appointment for follow up with your primary physician Marybeth Almazan MD in 2 weeks if symptoms are not improved.    Thank you for coming to [facility] today.  Lab Testing:  **If you had lab testing today and your results are reassuring or normal they will be mailed to you or sent through [employer] within 7 days.   **If the lab tests need quick action we will call you with the results.  The phone number we will call with results is # [phone] (home) . If this is not the best number please call our clinic and change the number.  Medication Refills:  If you need any refills please call your pharmacy and they will contact us.   If you need to  your refill at a new pharmacy, please contact the new pharmacy directly. The new pharmacy will help you get your medications transferred faster.   Scheduling:  If you have any concerns about today's visit or wish to schedule another appointment please call our office during normal business hours [phone] (8-5:00 M-F)  If a referral was made to a [facility] and you don't get a call from central scheduling please call [phone].  If a Mammogram was ordered for you at [facility] call [phone] to schedule or change your appointment.  If you had an XRay/CT/Ultrasound/MRI ordered the number  is [phone] to schedule or change your radiology appointment.   Medical Concerns:  If you have urgent medical concerns please call [phone] at any time of the day.    Ilan Velasquez MD

## 2018-04-04 NOTE — PROGRESS NOTES
Preceptor Attestation:   Patient seen, evaluated and discussed with the resident. I have verified the content of the note, which accurately reflects my assessment of the patient and the plan of care.   Supervising Physician:  Josette Ryan MD

## 2018-04-04 NOTE — MR AVS SNAPSHOT
After Visit Summary   4/4/2018    Isabell Cisse    MRN: [id number]           Patient Information     Date Of Birth          1935        Visit Information        Provider Department      4/4/2018 11:20 AM Ilan Velasquez MD [facility]        Today's Diagnoses     Allergic contact dermatitis due to adhesives    -  1    Acute right-sided low back pain without sciatica          Care Instructions    Here is the plan from today's visit    1. Allergic contact dermatitis due to adhesives    - triamcinolone (KENALOG) 0.1 % ointment; Apply sparingly to affected area three times daily for 14 days.  Dispense: 80 g; Refill: 0    2. Acute right-sided low back pain without sciatica  Take advil 600mg every 8 hours as needed for pain, take acetaminophen 1000mg every 8 hours as needed for pain  Ice or heat, which ever is preferred.  Do not use tens unit due to allergic reaction from adhesive.  Follow up for worsening symptoms or fever > 101.        Please call or return to clinic if your symptoms don't go away.    Follow up plan  Please make a clinic appointment for follow up with your primary physician Marybeth Almazan MD in 2 weeks if symptoms are not improved.    Thank you for coming to [facility] today.  Lab Testing:  **If you had lab testing today and your results are reassuring or normal they will be mailed to you or sent through PingStamp within 7 days.   **If the lab tests need quick action we will call you with the results.  The phone number we will call with results is # [phone] (home) . If this is not the best number please call our clinic and change the number.  Medication Refills:  If you need any refills please call your pharmacy and they will contact us.   If you need to  your refill at a new pharmacy, please contact the new pharmacy directly. The new pharmacy will help you get your medications transferred faster.   Scheduling:  If you have any concerns  about today's visit or wish to schedule another appointment please call our office during normal business hours [phone] (8-5:00 M-F)  If a referral was made to a [facility] and you don't get a call from central scheduling please call [phone].  If a Mammogram was ordered for you at [facility] call [phone] to schedule or change your appointment.  If you had an XRay/CT/Ultrasound/MRI ordered the number is [phone] to schedule or change your radiology appointment.   Medical Concerns:  If you have urgent medical concerns please call [phone] at any time of the day.    Ilan Velasquez MD            Follow-ups after your visit        Who to contact     Please call your clinic at [phone] to:    Ask questions about your health    Make or cancel appointments    Discuss your medicines    Learn about your test results    Speak to your doctor            Additional Information About Your Visit        AktiVaxharOpenTable Information     SeeSaw.com is an electronic gateway that provides easy, online access to your medical records. With [email or web address], you can request a clinic appointment, read your test results, renew a prescription or communicate with your care team.     To sign up for [email or web address] visit the website at [email or web address]   You will be asked to enter the access code listed below, as well as some personal information. Please follow the directions to create your username and password.     Your access code is: [email or web address]  Expires: 7/3/2018 12:20 PM     Your access code will  in 90 days. If you need help or a new code, please contact your [facility] or call [phone] for assistance.        Care EveryWhere ID     This is your Care EveryWhere ID. This could be used by other organizations to access your [facility] medical records  [id number]        Your Vitals Were     Pulse Temperature Respirations Pulse Oximetry BMI (Body Mass Index)        76 97.7  F (36.5  C) (Oral) 16 96% 23.64 kg/m2        Blood Pressure from Last 3 Encounters:   04/04/18 134/81   08/15/17 120/78   11/01/16 130/87    Weight from Last 3 Encounters:   04/04/18 122 lb (55.3 kg)   08/15/17 120 lb (54.4 kg)   11/01/16 120 lb 3.2 oz (54.5 kg)              We Performed the Following     Urinalysis, Micro If (UA) ([facility])          Today's Medication Changes          These changes are accurate as of 4/4/18 12:21 PM.  If you have any questions, ask your nurse or doctor.               Start taking these medicines.        Dose/Directions    triamcinolone 0.1 % ointment   Commonly known as:  KENALOG   Used for:  Allergic contact dermatitis due to adhesives   Started by:  Ilan Velasquez MD        Apply sparingly to affected area three times daily for 14 days.   Quantity:  80 g   Refills:  0            Where to get your medicines      These medications were sent to [facility] [address] [place] [address] AT [address] [address]     Phone:  [phone]     triamcinolone 0.1 % ointment                Primary Care Provider Office Phone # Fax #    Marybeth Almazan [phone]       [address] [address]        Equal Access to Services     [facility]: Hadii nelson chengo Somegan, waaxda luqadaha, qaybta kaalmada talon, osvaldo lambert . So [facility] [phone].    ATENCIÓN: Si habla español, tiene a perrin disposición servicios gratuitos de asistencia lingüística. Llame al [phone].    We comply with applicable federal civil rights laws and [place] laws. We do not discriminate on the basis of race, color, national origin, age, disability, sex, sexual orientation, or gender identity.            Thank you!     Thank you for choosing [facility]  for your care. Our goal is always to provide you with excellent care. Hearing back from  our patients is one way we can continue to improve our services. Please take a few minutes to complete the written survey that you may receive in the mail after your visit with us. Thank you!             Your Updated Medication List - Protect others around you: Learn how to safely use, store and throw away your medicines at www.disposemymeds.org.          This list is accurate as of 4/4/18 12:21 PM.  Always use your most recent med list.                   Brand Name Dispense Instructions for use Diagnosis    amLODIPine 5 MG tablet    NORVASC    90 tablet    Take 1 tablet (5 mg) by mouth daily - OFFER BPGAP    Essential hypertension with goal blood pressure less than 140/90       aspirin 81 MG tablet      Take 1 tablet by mouth Patient takes occasionally        CALCIUM 500 PO      1 tablet daily    Mammographic microcalcification, Preventative health care       lidocaine 4 % Crea cream    LMX4    1 Tube    Apply to the affected area topically as needed, 30 minutes before the upcoming procedure    DDD (degenerative disc disease), lumbar       losartan 50 MG tablet    COZAAR    90 tablet    Take 1 tablet (50 mg) by mouth daily - OFFER BPGAP    Essential hypertension with goal blood pressure less than 140/90       LYSINE PO      Take by mouth as needed        MAGNESIUM PO           metoprolol succinate 100 MG 24 hr tablet    TOPROL-XL    90 tablet    Take 1 tablet (100 mg) by mouth daily - OFFER BPGAP    Essential hypertension with goal blood pressure less than 140/90       Multi-vitamin Tabs tablet   Generic drug:  multivitamin, therapeutic with minerals      1 TABLET DAILY        triamcinolone 0.1 % ointment    KENALOG    80 g    Apply sparingly to affected area three times daily for 14 days.    Allergic contact dermatitis due to adhesives       VITAMIN C PO      1 tablet daily    Mammographic microcalcification, Preventative health care       VITAMIN D PO      1 tablet daily    Mammographic microcalcification,  Preventative health care       vitamin E 1000 UNIT capsule    TOCOPHEROL     Take 1,000 Units by mouth daily.    Preop general physical exam       ZINC 15 PO      Patient takes occasionally    Mammographic microcalcification, Preventative health care

## 2018-05-14 DIAGNOSIS — I10 ESSENTIAL HYPERTENSION WITH GOAL BLOOD PRESSURE LESS THAN 140/90: ICD-10-CM

## 2018-05-14 RX ORDER — METOPROLOL SUCCINATE 100 MG/1
100 TABLET, EXTENDED RELEASE ORAL DAILY
Qty: 90 TABLET | Refills: 2 | Status: SHIPPED | OUTPATIENT
Start: 2018-05-14 | End: 2018-08-29

## 2018-05-14 RX ORDER — LOSARTAN POTASSIUM 50 MG/1
50 TABLET ORAL DAILY
Qty: 90 TABLET | Refills: 2 | Status: SHIPPED | OUTPATIENT
Start: 2018-05-14 | End: 2018-08-29

## 2018-05-14 RX ORDER — AMLODIPINE BESYLATE 5 MG/1
5 TABLET ORAL DAILY
Qty: 90 TABLET | Refills: 2 | Status: SHIPPED | OUTPATIENT
Start: 2018-05-14 | End: 2018-08-29

## 2018-05-14 NOTE — TELEPHONE ENCOUNTER
Request for medication refill:    Date of last visit at clinic: 04/04/2018    Please complete refill if appropriate and CLOSE ENCOUNTER.    Closing the encounter signifies the refill is complete.    If refill has been denied, please complete the smart phrase .smirefuse and route it to the [facility] RN TRIAGE pool to inform the patient and the pharmacy.    Tyson Thomas, CMA

## 2018-05-14 NOTE — TELEPHONE ENCOUNTER
Request for medication refill:    Date of last visit at clinic: 04/04/18    Please complete refill if appropriate and CLOSE ENCOUNTER.    Closing the encounter signifies the refill is complete.    If refill has been denied, please complete the smart phrase .smirefuse and route it to the [facility] RN TRIAGE pool to inform the patient and the pharmacy.    Amaris Gonzalez, CMA

## 2018-08-29 ENCOUNTER — OFFICE VISIT (OUTPATIENT)
Dept: FAMILY MEDICINE | Facility: CLINIC | Age: 83
End: 2018-08-29
Payer: COMMERCIAL

## 2018-08-29 ENCOUNTER — DOCUMENTATION ONLY (OUTPATIENT)
Dept: VASCULAR SURGERY | Facility: CLINIC | Age: 83
End: 2018-08-29

## 2018-08-29 ENCOUNTER — OFFICE VISIT (OUTPATIENT)
Dept: PHARMACY | Facility: CLINIC | Age: 83
End: 2018-08-29
Payer: COMMERCIAL

## 2018-08-29 VITALS
HEIGHT: 61 IN | OXYGEN SATURATION: 96 % | RESPIRATION RATE: 16 BRPM | WEIGHT: 115.8 LBS | DIASTOLIC BLOOD PRESSURE: 79 MMHG | HEART RATE: 71 BPM | TEMPERATURE: 97.7 F | BODY MASS INDEX: 21.86 KG/M2 | SYSTOLIC BLOOD PRESSURE: 127 MMHG

## 2018-08-29 DIAGNOSIS — I10 ESSENTIAL HYPERTENSION WITH GOAL BLOOD PRESSURE LESS THAN 140/90: ICD-10-CM

## 2018-08-29 DIAGNOSIS — I71.40 ABDOMINAL AORTIC ANEURYSM (AAA) WITHOUT RUPTURE (H): ICD-10-CM

## 2018-08-29 DIAGNOSIS — M85.80 OSTEOPENIA, UNSPECIFIED LOCATION: ICD-10-CM

## 2018-08-29 DIAGNOSIS — E78.5 HYPERLIPIDEMIA LDL GOAL <130: ICD-10-CM

## 2018-08-29 DIAGNOSIS — R63.0 APPETITE LOSS: ICD-10-CM

## 2018-08-29 DIAGNOSIS — R63.4 WEIGHT LOSS: ICD-10-CM

## 2018-08-29 DIAGNOSIS — Z00.00 MEDICARE ANNUAL WELLNESS VISIT, SUBSEQUENT: Primary | ICD-10-CM

## 2018-08-29 DIAGNOSIS — D12.6 TUBULAR ADENOMA OF COLON: ICD-10-CM

## 2018-08-29 LAB
ALBUMIN SERPL-MCNC: 4.3 MG/DL (ref 3.5–4.7)
ALP SERPL-CCNC: 102 U/L (ref 31.7–110.5)
ALT SERPL-CCNC: 18.7 U/L (ref 0–45)
AST SERPL-CCNC: 20 U/L (ref 0–45)
BILIRUB SERPL-MCNC: 0.7 MG/DL (ref 0.2–1.3)
BILIRUBIN DIRECT: 0.2 MG/DL (ref 0.1–0.3)
BUN SERPL-MCNC: 24.5 MG/DL (ref 7–19)
CALCIUM SERPL-MCNC: 9.5 MG/DL (ref 8.5–10.1)
CHLORIDE SERPLBLD-SCNC: 103.7 MMOL/L (ref 98–110)
CHOLEST SERPL-MCNC: 194.6 MG/DL (ref 0–200)
CHOLEST/HDLC SERPL: 3.7 {RATIO} (ref 0–5)
CO2 SERPL-SCNC: 28.2 MMOL/L (ref 20–32)
CREAT SERPL-MCNC: 0.6 MG/DL (ref 0.5–1)
DEPRECATED CALCIDIOL+CALCIFEROL SERPL-MC: 42 UG/L (ref 20–75)
GFR SERPL CREATININE-BSD FRML MDRD: >90 ML/MIN/1.7 M2
GLUCOSE SERPL-MCNC: 106.8 MG'DL (ref 70–99)
HCT VFR BLD AUTO: 48.3 % (ref 35–47)
HDLC SERPL-MCNC: 52.8 MG/DL
HEMOGLOBIN: 14.6 G/DL (ref 11.7–15.7)
LDLC SERPL CALC-MCNC: 124 MG/DL (ref 0–129)
LIPASE SERPL-CCNC: 158 U/L (ref 73–393)
MCH RBC QN AUTO: 30.7 PG (ref 26.5–35)
MCHC RBC AUTO-ENTMCNC: 30.2 G/DL (ref 32–36)
MCV RBC AUTO: 101.5 FL (ref 78–100)
PLATELET # BLD AUTO: 256 K/UL (ref 150–450)
POTASSIUM SERPL-SCNC: 4 MMOL/DL (ref 3.3–4.5)
PROT SERPL-MCNC: 7 G/DL (ref 6.8–8.8)
RBC # BLD AUTO: 4.76 M/UL (ref 3.8–5.2)
SODIUM SERPL-SCNC: 136.5 MMOL/L (ref 132.6–141.4)
TRIGL SERPL-MCNC: 88.4 MG/DL (ref 0–150)
TSH SERPL DL<=0.005 MIU/L-ACNC: 1.13 MU/L (ref 0.4–4)
VLDL CHOLESTEROL: 17.7 MG/DL (ref 7–32)
WBC # BLD AUTO: 6.9 K/UL (ref 4–11)

## 2018-08-29 RX ORDER — LOSARTAN POTASSIUM 50 MG/1
50 TABLET ORAL DAILY
Qty: 90 TABLET | Refills: 3 | Status: SHIPPED | OUTPATIENT
Start: 2018-08-29 | End: 2019-09-03

## 2018-08-29 RX ORDER — AMLODIPINE BESYLATE 5 MG/1
5 TABLET ORAL DAILY
Qty: 90 TABLET | Refills: 3 | Status: SHIPPED | OUTPATIENT
Start: 2018-08-29 | End: 2019-09-03

## 2018-08-29 RX ORDER — METOPROLOL SUCCINATE 100 MG/1
100 TABLET, EXTENDED RELEASE ORAL DAILY
Qty: 90 TABLET | Refills: 3 | Status: SHIPPED | OUTPATIENT
Start: 2018-08-29 | End: 2019-09-03 | Stop reason: SINTOL

## 2018-08-29 NOTE — MR AVS SNAPSHOT
After Visit Summary   2018    Isabell Cisse    MRN: [id number]           Patient Information     Date Of Birth          1935        Visit Information        Provider Department      2018 9:00 AM [facility]        Today's Diagnoses     Essential hypertension with goal blood pressure less than 140/90           Follow-ups after your visit        Future tests that were ordered for you today     Open Future Orders        Priority Expected Expires Ordered    Albumin Random Urine Quantitative with Creat Ratio Routine  2019    US Abdominal Aorta Limited Routine  2019    DX Hip/Pelvis/Spine (DEXA) Routine  2019            Who to contact     Please call your clinic at [phone] to:    Ask questions about your health    Make or cancel appointments    Discuss your medicines    Learn about your test results    Speak to your doctor            Additional Information About Your Visit        MyChart Information     [employer] is an electronic gateway that provides easy, online access to your medical records. With [employer], you can request a clinic appointment, read your test results, renew a prescription or communicate with your care team.     To sign up for Stealzt visit the website at [email or web address]   You will be asked to enter the access code listed below, as well as some personal information. Please follow the directions to create your username and password.     Your access code is: [email or web address]  Expires: 2018 10:13 AM     Your access code will  in 90 days. If you need help or a new code, please contact your [facility] or call [phone] for assistance.        Care EveryWhere ID     This is your Care EveryWhere ID. This could be used by other organizations to access your [facility] medical records  [id number]         Blood Pressure from Last 3 Encounters:    08/29/18 127/79   04/04/18 134/81   08/15/17 120/78    Weight from Last 3 Encounters:   08/29/18 115 lb 12.8 oz (52.5 kg)   04/04/18 122 lb (55.3 kg)   08/15/17 120 lb (54.4 kg)              Today, you had the following     No orders found for display         Today's Medication Changes          These changes are accurate as of 8/29/18  4:19 PM.  If you have any questions, ask your nurse or doctor.               Stop taking these medicines if you haven't already. Please contact your care team if you have questions.     lidocaine 4 % Crea cream   Commonly known as:  LMX4   Stopped by:  Darinel Perry           triamcinolone 0.1 % ointment   Commonly known as:  KENALOG   Stopped by:  Darinel Perry                Where to get your medicines      These medications were sent to [facility] [address] [place] [address] AT [address] [address]     Phone:  [phone]     amLODIPine 5 MG tablet    losartan 50 MG tablet    metoprolol succinate 100 MG 24 hr tablet                Primary Care Provider Office Phone # Fax #    Marybeth Almazan [phone]       [address] [address]        Equal Access to Services     LEONARDO LICEA : Hadii nelson hall hadasho Soomaali, waaxda luqadaha, qaybta kaalmada adeegyada, osvaldo kimble haylaurel lambert . So [facility] [phone].    ATENCIÓN: Si habla español, tiene a perrin disposición servicios gratuitos de asistencia lingüística. Ivy al [phone].    We comply with applicable federal civil rights laws and [place] laws. We do not discriminate on the basis of race, color, national origin, age, disability, sex, sexual orientation, or gender identity.            Thank you!     Thank you for choosing [facility]  for your care. Our goal is always to provide you with excellent care. Hearing back from our patients is one way we can continue to improve our  services. Please take a few minutes to complete the written survey that you may receive in the mail after your visit with us. Thank you!             Your Updated Medication List - Protect others around you: Learn how to safely use, store and throw away your medicines at www.disposemymeds.org.          This list is accurate as of 8/29/18  4:19 PM.  Always use your most recent med list.                   Brand Name Dispense Instructions for use Diagnosis    amLODIPine 5 MG tablet    NORVASC    90 tablet    Take 1 tablet (5 mg) by mouth daily - OFFER BPGAP    Essential hypertension with goal blood pressure less than 140/90       aspirin 81 MG tablet      Take 1 tablet by mouth Patient takes occasionally        losartan 50 MG tablet    COZAAR    90 tablet    Take 1 tablet (50 mg) by mouth daily - OFFER BPGAP    Essential hypertension with goal blood pressure less than 140/90       LYSINE PO      Take by mouth as needed        MAGNESIUM PO           metoprolol succinate 100 MG 24 hr tablet    TOPROL-XL    90 tablet    Take 1 tablet (100 mg) by mouth daily - OFFER BPGAP    Essential hypertension with goal blood pressure less than 140/90       Multi-vitamin Tabs tablet   Generic drug:  multivitamin, therapeutic with minerals      1 TABLET DAILY        VITAMIN C PO      1 tablet daily    Mammographic microcalcification, Preventative health care       VITAMIN D PO      1 tablet daily    Mammographic microcalcification, Preventative health care       vitamin E 1000 UNIT capsule    TOCOPHEROL     Take 1,000 Units by mouth daily.    Preop general physical exam

## 2018-08-29 NOTE — PROGRESS NOTES
>66 year old Wellness Visit ( Medicare etc)           HPI       This 82 year old female presents as an established patient  Marybeth Almazan who presents for a  Annual Wellness Exam.    Other issues patient wants to address today:    none        Visual Acuity:  Right Eye: 20/25   Left Eye: 20/25  Both Eyes: 20/25    Patient Active Problem List   Diagnosis     DDD (degenerative disc disease), lumbar     Diverticulosis of colon     Advanced directives, counseling/discussion     Abdominal aortic aneurysm (H)     Hyperlipidemia LDL goal <130     Tobacco abuse     Calculus of kidney     Seborrheic keratosis     Rotator cuff strain     Right shoulder pain     Essential hypertension with goal blood pressure less than 140/90     Osteopenia     Balance problems     History of shingles       Past Medical History:   Diagnosis Date     Hypertension      NO ACTIVE PROBLEMS         Family History   Problem Relation Age of Onset     Cerebrovascular Disease Mother      Breast Cancer Mother      Hypertension Mother      Arthritis Mother      Eye Disorder Mother      Thyroid Disease Mother      Genitourinary Problems Father      kidney     Diabetes Father      Cancer Father          Problem List, Family History and past Medical History reviewed and unchanged/updated.       Health risk Assessment/ Review of Systems:         Constitutional:   Fevers or night sweats?  NO      Eyes:   Vision problems?  NO            Hearing:  Do you feel you have hearing loss?  NO  Cardiovascular:  Chest pain, palpitations, or pain with walking?  NO        Respiratory:   Breathing problems or cough?  NO    :   Difficulty controlling urination?  NO        Musculoskeletal:   Stiffness or sore joints?  NO            Skin:   concerning lesions or moles?  NO           Nervous System:   loss of strength or sensation,  numbness or tingling,  tremor,  dizziness,  headache?  NO         Mental Health:   depression or anxiety,  sleep problems?  NO   Cognition:   Memory problems?  NO       Weight Loss: Have you lost 10 or more pounds unintentionally in the previous year?  NO  Energy: How much of the time during the past 3 weeks did you feel tired?   (check one of the following):   ?  All of the time  ? Most of the time  ? Some of the time  ? A little of the time  ? None of the Time    PHQ-2 Score:   PHQ-2 ( 1999 Pfizer) 8/29/2018 4/4/2018   Q1: Little interest or pleasure in doing things 0 (No Data)   Q2: Feeling down, depressed or hopeless 0 (No Data)   PHQ-2 Score 0 -       PHQ-9 Score:   No flowsheet data found.  Medical Care:     What other specialists or organizations are involved in your medical care?  None   Patient Care Team       Relationship Specialty Notifications Start End    Marybeth Almazan MD PCP - General [facility]  11/1/16     Phone: [phone] Fax: [phone]         [address] [place] [address]    Marybeth Almazan MD [facility]  11/2/16     Comment:  referred to PT for balance    Phone: [phone] Fax: [phone]         [address] [place] [address]              Social History / Home Safety     Social History   Substance Use Topics     Smoking status: Current Every Day Smoker     Packs/day: 0.25     Years: 50.00     Types: Cigarettes     Smokeless tobacco: Never Used     Alcohol use No     Marital Status:Single  Who lives in your household? Lives with daughter and 2 grandchildren  Does your home have any of the following safety concerns? Loose rugs in the hallway, no grab bars in the bathroom, no handrails on the stairs or have poorly lit areas?  No   Do you feel threatened or controlled by a partner, ex-partner or anyone in your life? {Yes No   Has anyone hurt you physically, for example by pushing, hitting, slapping or kicking you   or forcing you to have sex? No       Functional Status     Do you need help with dressing yourself, bathing, or walking?No   Do you need help with the phone,  transportation, shopping, preparing meals, housework, laundry, medications or managing money?No   By yourself and not using aids, do you have any difficulty walking up 10 steps  without resting? No   By yourself and not using aids, do you have any difficulty walking several hundred yards? No              Risk Behaviors and Healthy Habits     History   Smoking Status     Current Every Day Smoker     Packs/day: 0.25     Years: 50.00     Types: Cigarettes   Smokeless Tobacco     Never Used     How many servings of fruits and vegetables do you eat a day? 2 total  Exercise:Housework and working in the garden 2-3 days/week for an average of 15-30 minutes  Do you frequently drive without a seatbelt? No   History   Smoking Status     Current Every Day Smoker     Packs/day: 0.25     Years: 50.00     Types: Cigarettes   Smokeless Tobacco     Never Used     Do you use any other drugs? No       Do you use alcohol?No        Functional Ability   Was the patient's timed Up & Go test (Get up from chair walk, 10 feet turn, return to chair and sit down) unsteady or longer than 30 seconds? No     Fall risk:     1. Have you fallen two or more times in the past year? No   2. Have you fallen and had an injury in the past year?  No         Evaulation of Cognitive Function     Family member/caregiver input: Normal  COGNITIVE SCREEN  1) Repeat 3 items (Leader, Season, Table)    2) Clock draw: NORMAL  3) 3 item recall: Recalls 3 objects  Results: 3 items recalled: COGNITIVE IMPAIRMENT LESS LIKELY    Mini-CogTM Copyright ULIZ Vazquez. Licensed by the author for use in [facility]; reprinted with permission (lazaro@.[email or web address]). All rights reserved.              Other Assessments:     CV Risk based on Pooled Cohort Risk (consider assessing every 4-6 years; consider statin in patients with 10-yr risk > 7.5%):   The ASCVD Risk score ([place] [facility] Jr, et al., 2013) failed to calculate for the following reasons:    The 2013 ASCVD risk score is only  "valid for ages 40 to 79    Advance Directives: Discussed with patient and family as appropriate.  Has patient completed advance directives and/or a living will?  yes     Immunization History   Administered Date(s) Administered     Influenza (IIV3) PF 11/18/2008, 09/27/2012     Influenza Vaccine IM 3yrs+ 4 Valent IIV4 09/01/2013     Pneumo Conj 13-V (2010&after) 12/04/2015     Pneumococcal 23 valent 10/15/1999, 11/14/2001     Poliovirus, inactivated (IPV) 02/02/2012     TD (ADULT, 7+) 11/18/2008     Twinrix A/B 02/02/2012, 03/05/2012     Typhoid IM 02/02/2012     Zoster vaccine, live 08/15/2017     Reviewed Immunization Record Today    Physical Exam     Vitals: /79  Pulse 71  Temp 97.7  F (36.5  C) (Oral)  Resp 16  Ht 5' 0.51\" (153.7 cm)  Wt 115 lb 12.8 oz (52.5 kg)  SpO2 96%  BMI 22.23 kg/m2  BMI= Body mass index is 22.23 kg/(m^2).     Wt Readings from Last 4 Encounters:   08/29/18 115 lb 12.8 oz (52.5 kg)   04/04/18 122 lb (55.3 kg)   08/15/17 120 lb (54.4 kg)   11/01/16 120 lb 3.2 oz (54.5 kg)       GENERAL APPEARANCE: alert and no distress  HENT: ear canals patent and TM's normal; mouth without ulcers or lesions; and oral mucous membranes moist  Hearing loss screen:   Normal   DENTITION:  Good repair?  yes  RESP: lungs clear to auscultation - no rales, rhonchi or wheezes  CV: regular rates and rhythm, no murmur, click or rub, no peripheral edema and peripheral pulses strong  ABD: soft, NT, ND. No HSM or masses, + BS  SKIN: no suspicious lesions or rashes  NEURO: Normal strength and tone  MENTAL STATUS EXAM  Appearance: appropriate  Attitude: cooperative  Behavior: normal  Eye Contact: normal  Speech: normal  Orientation: oreinted to person , place, time and situation  Mood:  good  Affect: Mood Congruent  Thought Process: clear        Assessment and Plan     Welcome to Medicare Preventive Visit / Initial Medicare Annual Wellness Exam - reviewed Preventive Services and Plan form with patient as " specified in Patient Instructions.      Diagnoses and all orders for this visit:    Medicare annual wellness visit, subsequent    Weight loss, low appetite        -     CBC with Plt ([facility])  -     TSH with free T4 reflex  -     Hepatic Panel ([facility])  -     Lipase    Hyperlipidemia LDL goal <130  -     Lipid Cascade ([facility])    Abdominal aortic aneurysm (AAA) without rupture (H)  -     [facility] NOTIFICATION  -     US Abdominal Aorta Limited; Future    Essential hypertension with goal blood pressure less than 140/90  -     Basic Metabolic Panel ([facility])  -     Cancel: Albumin Random Urine Quantitative with Creat Ratio  -     Albumin Random Urine Quantitative with Creat Ratio; Future    Osteopenia, unspecified location  -     Vitamin D Level  -     DX Hip/Pelvis/Spine (DEXA); Future    Tubular adenoma of colon        -     Declined follow-up colonoscopy      All results by mail. I will call if further evaluation is warranted.      Options for treatment and follow-up care were reviewed with the Isabell De Leónak and/or guardian engaged in the decision making process and verbalized understanding of the options discussed and agreed with the final plan.    Marybeth Almazan MD

## 2018-08-29 NOTE — PROGRESS NOTES
I have verified the content of the note, which accurately reflects my assessment of the patient and the plan of care.   Darinel Perry, PharmD

## 2018-08-29 NOTE — MR AVS SNAPSHOT
After Visit Summary   8/29/2018    Isabell Cisse    MRN: [id number]           Patient Information     Date Of Birth          1935        Visit Information        Provider Department      8/29/2018 9:20 AM Marybeth Almazan MD [facility]        Today's Diagnoses     Medicare annual wellness visit, subsequent    -  1    Hyperlipidemia LDL goal <130        Abdominal aortic aneurysm (AAA) without rupture (H)        Essential hypertension with goal blood pressure less than 140/90        Osteopenia, unspecified location        Tubular adenoma of colon          Care Instructions    [facility]     Review these tests with your doctor then decide which ones you want and take this page home for your reference                                                    IMMUNIZATIONS Description Recommend today?     Hepatitis B  If you have any of the following risk factors you should be immunized for hepatitis B: severe kidney disease, people who live in the same house as a carrier of Hepatitis B virus, people who live in  institutions (e.g. nursing homes or group homes), homosexual men, patients with hemophilia who received Factor VIII or IX concentrates, abusers of illicit injectable drugs No: is not indicated today.     SCREENING TESTS     Description   Year of Last Screening   Recommended Today?   Heart disease screening blood tests    Cholesterol level Reducing cholesterol can reduce your risk of heart attacks by 25%.  Screening is recommended yearly if you are at risk of heart disease otherwise every 4-5 years  Yes; Recommended and ordered.   Diabetes screening tests    Hemoglobin A1c blood test   Finding and treating diabetes early can reduce complications.  Screening recommended/covered yearly if you have high blood pressure, high cholesterol, obesity (BMI >30), or a history of high blood glucose tests; or 2 of the following: family  history of diabetes, overweight (BMI >25 but <30), age 65 years or older, and a history of diabetes of pregnancy or gave birth to baby weighing more than 9 lbs.  No: is not indicated today.   Hepatitis B screening Finding hepatitis B early can reduce complications.  Screening is recommended for persons with selected risk factors.  No: is not indicated today.   Hepatitis C screening Finding hepatitis C early can reduce complications.  Screening is recommended for all persons born from 1945 through 1965 and for those with selected other risk factors.   No: is not indicated today.   HIV screening Finding HIV early can reduce complications.  Screening is recommended for persons with risk factors for HIV infection.  No: is not indicated today.   Glaucoma screening Early detection of glaucoma can prevent blindness.   Please talk to your eye doctor about this.       SCREENING TESTS     Description   Year of Last Screening   Recommended Today?   Colorectal cancer screening    Fecal occult blood test     Screening colonoscopy Screening for colon cancer has been shown to reduce death from colon cancer by 25-30%. Screening recommended to start at 50 years and continuing until age 75 years.   2011 Recommeded and patient declined.    Breast Cancer Screening (women)    Mammogram Mammogram screening for breast cancer has been shown to reduce the risk of dying from breast cancer and prolong life. Screening is recommended every 1-2 years for women aged 50 to 74 years.   No: is not indicated today.   Cervical Cancer screening (women)    Pap Cervical pap smears can reduce cervical cancer. Screening is recommended annually if high risk (history of abnormal pap smears) otherwise every 2-3 years, stop screening at 65 years of age if history of normal paps.  No: is not indicated today.   Screening for Osteoporosis:  Bone mass measurements (women)    Dexa Scan Screening and treating Osteoporosis can reduce the risk of hip and spine  fractures. Screening is recommended in women 65 years or older and in women and men at risk of osteoporosis. 2014 Yes; Recommended and ordered.   Screening for Lung Cancer     Low-dose CT scanning Screening can reduce mortality in persons aged 55-80 who have smoked at least 30 pack-years and who are either still smoking or have quit in the past 15 years.  No: is not indicated today.   Abdominal Aortic Aneurysm (AAA) screening    Ultrasound (US)   An aneurysm treated before rupture is very safe -a ruptured aneurysm can be fatal.  Screening  by US for AAA is limited to patients who meet one of the following criteria:    Men who are 65-75 years old and have smoked more than 100 cigarettes in their lifetime    Anyone with a family history of abdominal aortic aneurysm 2017 Yes; Recommended and ordered.             MEDICARE WELLNESS EXAM PATIENT INSTRUCTIONS    Yearly exam:     See your health care provider every year in order to review changes in your health, review medicines that you take, and discuss preventive care needs such as immunizations and cancer screening.    Get a flu shot each year.     Advance Directives:    If you have not done so, you are encouraged to complete advance directives and/or a living will.   More information about advance directives can be found at: http://www.[place]med.org/advocacy/Key-Issues/Advance-Directives    Nutrition:     Eat at least 5 servings of fruits and vegetables each day.     Eat whole-grain bread, whole-wheat pasta and brown rice instead of white grains and rice.     Talk to your doctor about Calcium and Vitamin D.     Lifestyle:    Exercise for at least 150 minutes a week (30 minutes a day, 5 days a week). This will help you control your weight and prevent disease.     Limit alcohol to one drink per day.     If you smoke, try to quit - your doctor will be happy to help.     Wear sunscreen to prevent skin cancer.     See your dentist every six months for an exam and cleaning.  "    See your eye doctor every 1 to 2 years to screen for conditions such as glaucoma, macular degeneration and cataracts.              Follow-ups after your visit        Future tests that were ordered for you today     Open Future Orders        Priority Expected Expires Ordered    US Abdominal Aorta Limited Routine  2019    DX Hip/Pelvis/Spine (DEXA) Routine  2019            Who to contact     Please call your clinic at [phone] to:    Ask questions about your health    Make or cancel appointments    Discuss your medicines    Learn about your test results    Speak to your doctor            Additional Information About Your Visit        [employer] Information     [employer] is an electronic gateway that provides easy, online access to your medical records. With [employer], you can request a clinic appointment, read your test results, renew a prescription or communicate with your care team.     To sign up for [employer] visit the website at [email or web address]   You will be asked to enter the access code listed below, as well as some personal information. Please follow the directions to create your username and password.     Your access code is: [email or web address]  Expires: 2018 10:13 AM     Your access code will  in 90 days. If you need help or a new code, please contact your [facility] or call [phone] for assistance.        Care EveryWhere ID     This is your Care EveryWhere ID. This could be used by other organizations to access your [place] medical records  [id number]        Your Vitals Were     Pulse Temperature Respirations Height Pulse Oximetry BMI (Body Mass Index)    71 97.7  F (36.5  C) (Oral) 16 5' 0.51\" (153.7 cm) 96% 22.23 kg/m2       Blood Pressure from Last 3 Encounters:   18 127/79   18 134/81   08/15/17 120/78    Weight from Last 3 Encounters:   18 115 lb 12.8 oz (52.5 kg)   18 122 lb (55.3 kg) "   08/15/17 120 lb (54.4 kg)              We Performed the Following     Albumin Random Urine Quantitative with Creat Ratio     [facility]ENTER NOTIFICATION     Basic Metabolic Panel ([facility])     CBC with Plt ([facility])     Lipid Cascade ([facility])     Vitamin D Level          Today's Medication Changes          These changes are accurate as of 8/29/18 10:13 AM.  If you have any questions, ask your nurse or doctor.               Stop taking these medicines if you haven't already. Please contact your care team if you have questions.     lidocaine 4 % Crea cream   Commonly known as:  LMX4   Stopped by:  Darinel Perry           triamcinolone 0.1 % ointment   Commonly known as:  KENALOG   Stopped by:  Darinel Perry                Where to get your medicines      These medications were sent to [facility] [address] [place] [address] AT [address] [address]     Phone:  [phone]     amLODIPine 5 MG tablet    losartan 50 MG tablet    metoprolol succinate 100 MG 24 hr tablet                Primary Care Provider Office Phone # Fax #    Marybeth Almazan [phone]       [address] [address]        Equal Access to Services     LEONARDO LICEA AH: Hadii nelson chengo Soomaali, waaxda luqadaha, qaybta kaalmada adeegyada, osvaldo tse. So [facility] [phone].    ATENCIÓN: Si habla español, tiene a perrin disposición servicios gratuitos de asistencia lingüística. Ivy al [phone].    We comply with applicable federal civil rights laws and [place] laws. We do not discriminate on the basis of race, color, national origin, age, disability, sex, sexual orientation, or gender identity.            Thank you!     Thank you for choosing hospitals [facility]  for your care. Our goal is always to provide you with excellent care. Hearing back from our patients is one way we can continue  to improve our services. Please take a few minutes to complete the written survey that you may receive in the mail after your visit with us. Thank you!             Your Updated Medication List - Protect others around you: Learn how to safely use, store and throw away your medicines at www.disposemymeds.org.          This list is accurate as of 8/29/18 10:13 AM.  Always use your most recent med list.                   Brand Name Dispense Instructions for use Diagnosis    amLODIPine 5 MG tablet    NORVASC    90 tablet    Take 1 tablet (5 mg) by mouth daily - OFFER BPGAP    Essential hypertension with goal blood pressure less than 140/90       aspirin 81 MG tablet      Take 1 tablet by mouth Patient takes occasionally        losartan 50 MG tablet    COZAAR    90 tablet    Take 1 tablet (50 mg) by mouth daily - OFFER BPGAP    Essential hypertension with goal blood pressure less than 140/90       LYSINE PO      Take by mouth as needed        MAGNESIUM PO           metoprolol succinate 100 MG 24 hr tablet    TOPROL-XL    90 tablet    Take 1 tablet (100 mg) by mouth daily - OFFER BPGAP    Essential hypertension with goal blood pressure less than 140/90       Multi-vitamin Tabs tablet   Generic drug:  multivitamin, therapeutic with minerals      1 TABLET DAILY        VITAMIN C PO      1 tablet daily    Mammographic microcalcification, Preventative health care       VITAMIN D PO      1 tablet daily    Mammographic microcalcification, Preventative health care       vitamin E 1000 UNIT capsule    TOCOPHEROL     Take 1,000 Units by mouth daily.    Preop general physical exam

## 2018-08-29 NOTE — LETTER
September 14, 2018      Isabell Cisse  [address]  [facility] [address]        Dear Isabell,    Thank you for getting your care at [facility]. Please see below for your test results.  Overall, in or close to normal range and without concerning findings.     Resulted Orders   Lipid Cascade ([facility])   Result Value Ref Range    Cholesterol 194.6 0.0 - 200.0 mg/dL    Cholesterol/HDL Ratio 3.7 0.0 - 5.0    HDL Cholesterol 52.8 >40.0 mg/dL    LDL Cholesterol Calculated 124 0 - 129 mg/dL    Triglycerides 88.4 0.0 - 150.0 mg/dL    VLDL Cholesterol 17.7 7.0 - 32.0 mg/dL   Basic Metabolic Panel ([facility])   Result Value Ref Range    Urea Nitrogen 24.5 (H) 7.0 - 19.0 mg/dL    Calcium 9.5 8.5 - 10.1 mg/dL    Chloride 103.7 98.0 - 110.0 mmol/L    Carbon Dioxide 28.2 20.0 - 32.0 mmol/L    Creatinine 0.6 0.5 - 1.0 mg/dL    Glucose 106.8 (H) 70.0 - 99.0 mg'dL    Potassium 4.0 3.3 - 4.5 mmol/dL    Sodium 136.5 132.6 - 141.4 mmol/L    GFR Estimate >90 >60.0 mL/min/1.7 m2    GFR Estimate If Black >90 >60.0 mL/min/1.7 m2   CBC with Plt ([facility])   Result Value Ref Range    WBC 6.9 4.0 - 11.0 K/uL    RBC 4.76 3.80 - 5.20 M/uL    Hemoglobin 14.6 11.7 - 15.7 g/dL    Hematocrit 48.3 (H) 35.0 - 47.0 %    .5 (H) 78.0 - 100.0 fL    MCH 30.7 26.5 - 35.0 pg    MCHC 30.2 (L) 32.0 - 36.0 g/dL    Platelets 256.0 150.0 - 450.0 K/uL   Vitamin D Level   Result Value Ref Range    Vitamin D Deficiency screening 42 20 - 75 ug/L      Comment:      Season, race, dietary intake, and treatment affect the concentration of   25-hydroxy-Vitamin D. Values may decrease during winter months and increase   during summer months. Values 20-29 ug/L may indicate Vitamin D insufficiency   and values <20 ug/L may indicate Vitamin D deficiency.  Vitamin D determination is routinely performed by an immunoassay specific for   25 hydroxyvitamin D3.  If an individual is on vitamin D2 (ergocalciferol)   supplementation, please specify 25 OH vitamin D2  and D3 level determination by   LCMSMS test [id number].     TSH with free T4 reflex   Result Value Ref Range    TSH 1.13 0.40 - 4.00 mU/L   Hepatic Panel ([facility])   Result Value Ref Range    Albumin 4.3 3.5 - 4.7 mg/dL    Alkaline Phosphatase 102.0 31.7 - 110.5 U/L    ALT 18.7 0.0 - 45.0 U/L    AST 20.0 0.0 - 45.0 U/L    Bilirubin Direct 0.2 0.1 - 0.3 mg/dL    Bilirubin Total 0.7 0.2 - 1.3 mg/dL    Protein Total 7.0 6.8 - 8.8 g/dL   Lipase   Result Value Ref Range    Lipase 158 73 - 393 U/L       If you have any concerns about these results please call and leave a message for me or send a MyChart message to the clinic.    Sincerely,    Marybeth Almazan MD

## 2018-08-29 NOTE — PROGRESS NOTES
Clinical Pharmacy Wellness Note     Isabell was referred by Dr. KESHIA Almazan for an annual wellness encounter       MEDICATION REVIEW:  Discussed all medication indications, dosage and effectiveness, adverse effects, and adherence with patient/caregiver.    Pt had meds with them: no  Pt had med list with them: no  Pt was knowledgeable about meds: yes  Medications set up by: self  Medications administered by someone else (e.g., LTCF): No  Pt uses a medication box or automated dispenser: yes  Called pharmacy to obtain or clarify med list:  no  Called HHN or LTCF to obtain or clarify med list:  no    Medication Discrepancies  Medications on EMR med list that pt is NOT taking:  Yes, creams  Medications pt IS taking that are NOT on EMR med list (e.g., from specialist, hospital): none  OTC meds/ dietary supplements pt taking on own that are NOT on EMR med list:  yes, vitamins  Dosage listed differently than how patient is taking: none  Frequency listed differently than how patient is taking: none  Duplicate medication on list (two occurrences of the same medication):  none  TOTAL NUMBER OF MEDICATION DISCREPANCIES:  4  ______________________________________________________________________      Subjective:  Isabell is here today for an annual wellness visit with Dr. Almazan.  She is joined by her daughter.  Isabell is on very few prescription medications and says she takes her BP meds every day.  She has two different pill boxes--one with her BP meds and another for her vitamins.  She states that her  used to organize the vitamins because he was really into being healthy and taking vitamins.  Now that he is no longer here, she has many vitamins leftover and takes them when she feels like it or when she remembers.  In the last week, she has not taken any of her vitamins.        1. Essential hypertension with goal blood pressure less than 130/90  -Takes amlodipine 5mg daily  -Takes losartan 50mg daily   -Takes metoprolol  succinate 100mg daily   -Takes ASA 81mg occasionally with her vitamins   -Does not check BP at home and states she doesn't care what it is    2. Vitamins  -Takes many vitamins occasionally; possibly once a week or when she remembers    Patient reported being compliant all of the time   Patient reports no side effects.      Objective:    There were no vitals taken for this visit.  GFR Estimate   Date Value Ref Range Status   08/29/2018 >90 >60.0 mL/min/1.7 m2 Final   08/15/2017 85.6 >60.0 mL/min/1.7 m2 Final   06/07/2016 83 >60 mL/min/1.7m2 Final     Comment:     Non  GFR Calc     GFR Estimate If Black   Date Value Ref Range Status   08/29/2018 >90 >60.0 mL/min/1.7 m2 Final   08/15/2017 >90 >60.0 mL/min/1.7 m2 Final   06/07/2016 >90   GFR Calc   >60 mL/min/1.7m2 Final       Patient Active Problem List   Diagnosis     DDD (degenerative disc disease), lumbar     Diverticulosis of colon     Advanced directives, counseling/discussion     Abdominal aortic aneurysm (H)     Hyperlipidemia LDL goal <130     Tobacco abuse     Calculus of kidney     Seborrheic keratosis     Rotator cuff strain     Right shoulder pain     Essential hypertension with goal blood pressure less than 140/90     Osteopenia     Balance problems     History of shingles     Tubular adenoma of colon     SOCIAL HISTORY:   reports that she has been smoking Cigarettes.  She has a 12.50 pack-year smoking history. She has never used smokeless tobacco. She reports that she does not drink alcohol or use illicit drugs.    Current Outpatient Prescriptions   Medication Sig Dispense Refill     amLODIPine (NORVASC) 5 MG tablet Take 1 tablet (5 mg) by mouth daily - OFFER BPGAP 90 tablet 3     aspirin 81 MG tablet Take 1 tablet by mouth Patient takes occasionally       losartan (COZAAR) 50 MG tablet Take 1 tablet (50 mg) by mouth daily - OFFER BPGAP 90 tablet 3     MAGNESIUM PO        metoprolol succinate (TOPROL-XL) 100 MG 24 hr tablet  Take 1 tablet (100 mg) by mouth daily - OFFER BPGAP 90 tablet 3     MULTI-VITAMIN OR TABS 1 TABLET DAILY       VITAMIN C OR 1 tablet daily       VITAMIN D OR 1 tablet daily       Vitamin E (TOCOPHEROL) 1000 UNIT capsule Take 1,000 Units by mouth daily.       LYSINE PO Take by mouth as needed       [DISCONTINUED] amLODIPine (NORVASC) 5 MG tablet Take 1 tablet (5 mg) by mouth daily - OFFER BPGAP 90 tablet 2     [DISCONTINUED] losartan (COZAAR) 50 MG tablet Take 1 tablet (50 mg) by mouth daily - OFFER BPGAP 90 tablet 2     [DISCONTINUED] metoprolol succinate (TOPROL-XL) 100 MG 24 hr tablet Take 1 tablet (100 mg) by mouth daily - OFFER BPGAP 90 tablet 2     Allergies   Allergen Reactions     Celebrex [Celecoxib] Nausea and Vomiting     Codeine Nausea       Environmental factors that may impact patient: none.    There were no vitals taken for this visit.    Drug Therapy Assessment:  Drug therapy problems identified:     I have reviewed All medications taken by the Isabell.  Medications were reviewed and assessed for indicated, effective, safe and convenient. Drug therapy problem identified today listed described below:      1. Essential hypertension with goal blood pressure less than 130/90       Status:  controlled       DTP:  None     Isabell is an 82 year old female presenting to clinic today for an annual wellness visit.  Overall, she is a very healthy woman with high blood pressure.  Today her BP was controlled with a second BP reading of 127 mmHg systolic.  I recommend she continue taking her BP medication as prescribed to continue to manage her HTN.  We also discussed the use of aspirin.  Currently, she takes it with her vitamins occasionally.  We talked about taking it with her HTN medications for heart protection.  She thought this was a good idea and will move it to her BP pill box.       Drug Therapy Plan and Follow up:    Plan  Medication list was updated today to reflect the medication reconciliation  performed.       1. Essential hypertension with goal blood pressure less than 130/90  -Continue taking amlodipine 5mg daily  -Continue taking losartan 50mg daily   -Continue taking metoprolol succinate 100mg daily   -Start taking ASA 81mg daily    Follow up: with PCP as directed   Patient was provided with written instructions/medication list via AVS      Options for treatment and/or follow-up care were reviewed with the patient. Patient was engaged and actively involved in the decision making process.  Isabell Cisse verbalized understanding of the options discussed and was satisfied with the final plan.      Dr. KESHIA Almazan was provided my action today in clinic and was available for supervision during this visit and is the authorizing prescriber for this visit through the pharmacist collaborative practice agreement.      Isis Ornelas, Pharm.D             Total Time: 15  # DTPs Identified: 0     ,  ,  ,  ,  ,  ,  ,  ,     ,  ,  ,  ,  ,  ,  ,  ,     ,  ,  ,  ,  ,  ,  ,  ,     ,  ,  ,

## 2018-08-29 NOTE — PATIENT INSTRUCTIONS
[facility] PERSONAL PREVENTIVE SERVICES PLAN - SERVICES     Review these tests with your doctor then decide which ones you want and take this page home for your reference                                                    IMMUNIZATIONS Description Recommend today?     Hepatitis B  If you have any of the following risk factors you should be immunized for hepatitis B: severe kidney disease, people who live in the same house as a carrier of Hepatitis B virus, people who live in  institutions (e.g. nursing homes or group homes), homosexual men, patients with hemophilia who received Factor VIII or IX concentrates, abusers of illicit injectable drugs No: is not indicated today.     SCREENING TESTS     Description   Year of Last Screening   Recommended Today?   Heart disease screening blood tests    Cholesterol level Reducing cholesterol can reduce your risk of heart attacks by 25%.  Screening is recommended yearly if you are at risk of heart disease otherwise every 4-5 years  Yes; Recommended and ordered.   Diabetes screening tests    Hemoglobin A1c blood test   Finding and treating diabetes early can reduce complications.  Screening recommended/covered yearly if you have high blood pressure, high cholesterol, obesity (BMI >30), or a history of high blood glucose tests; or 2 of the following: family history of diabetes, overweight (BMI >25 but <30), age 65 years or older, and a history of diabetes of pregnancy or gave birth to baby weighing more than 9 lbs.  No: is not indicated today.   Hepatitis B screening Finding hepatitis B early can reduce complications.  Screening is recommended for persons with selected risk factors.  No: is not indicated today.   Hepatitis C screening Finding hepatitis C early can reduce complications.  Screening is recommended for all persons born from 1945 through 1965 and for those with selected other risk factors.   No: is not indicated today.   HIV screening Finding HIV early can  reduce complications.  Screening is recommended for persons with risk factors for HIV infection.  No: is not indicated today.   Glaucoma screening Early detection of glaucoma can prevent blindness.   Please talk to your eye doctor about this.       SCREENING TESTS     Description   Year of Last Screening   Recommended Today?   Colorectal cancer screening    Fecal occult blood test     Screening colonoscopy Screening for colon cancer has been shown to reduce death from colon cancer by 25-30%. Screening recommended to start at 50 years and continuing until age 75 years.   2011 Recommeded and patient declined.    Breast Cancer Screening (women)    Mammogram Mammogram screening for breast cancer has been shown to reduce the risk of dying from breast cancer and prolong life. Screening is recommended every 1-2 years for women aged 50 to 74 years.   No: is not indicated today.   Cervical Cancer screening (women)    Pap Cervical pap smears can reduce cervical cancer. Screening is recommended annually if high risk (history of abnormal pap smears) otherwise every 2-3 years, stop screening at 65 years of age if history of normal paps.  No: is not indicated today.   Screening for Osteoporosis:  Bone mass measurements (women)    Dexa Scan Screening and treating Osteoporosis can reduce the risk of hip and spine fractures. Screening is recommended in women 65 years or older and in women and men at risk of osteoporosis. 2014 Yes; Recommended and ordered.   Screening for Lung Cancer     Low-dose CT scanning Screening can reduce mortality in persons aged 55-80 who have smoked at least 30 pack-years and who are either still smoking or have quit in the past 15 years.  No: is not indicated today.   Abdominal Aortic Aneurysm (AAA) screening    Ultrasound (US)   An aneurysm treated before rupture is very safe -a ruptured aneurysm can be fatal.  Screening  by US for AAA is limited to patients who meet one of the following criteria:    Men  who are 65-75 years old and have smoked more than 100 cigarettes in their lifetime    Anyone with a family history of abdominal aortic aneurysm 2017 Yes; Recommended and ordered.             MEDICARE WELLNESS EXAM PATIENT INSTRUCTIONS    Yearly exam:     See your health care provider every year in order to review changes in your health, review medicines that you take, and discuss preventive care needs such as immunizations and cancer screening.    Get a flu shot each year.     Advance Directives:    If you have not done so, you are encouraged to complete advance directives and/or a living will.   More information about advance directives can be found at: http://www.[place]med.org/advocacy/Key-Issues/Advance-Directives    Nutrition:     Eat at least 5 servings of fruits and vegetables each day.     Eat whole-grain bread, whole-wheat pasta and brown rice instead of white grains and rice.     Talk to your doctor about Calcium and Vitamin D.     Lifestyle:    Exercise for at least 150 minutes a week (30 minutes a day, 5 days a week). This will help you control your weight and prevent disease.     Limit alcohol to one drink per day.     If you smoke, try to quit - your doctor will be happy to help.     Wear sunscreen to prevent skin cancer.     See your dentist every six months for an exam and cleaning.     See your eye doctor every 1 to 2 years to screen for conditions such as glaucoma, macular degeneration and cataracts.

## 2018-10-09 ENCOUNTER — RADIANT APPOINTMENT (OUTPATIENT)
Dept: BONE DENSITY | Facility: CLINIC | Age: 83
End: 2018-10-09
Attending: FAMILY MEDICINE
Payer: COMMERCIAL

## 2018-10-09 ENCOUNTER — RADIANT APPOINTMENT (OUTPATIENT)
Dept: ULTRASOUND IMAGING | Facility: CLINIC | Age: 83
End: 2018-10-09
Attending: FAMILY MEDICINE
Payer: COMMERCIAL

## 2018-10-09 DIAGNOSIS — M85.80 OSTEOPENIA, UNSPECIFIED LOCATION: ICD-10-CM

## 2018-10-09 DIAGNOSIS — I71.40 ABDOMINAL AORTIC ANEURYSM (AAA) WITHOUT RUPTURE (H): ICD-10-CM

## 2018-10-09 LAB — RADIOLOGIST FLAGS: NORMAL

## 2019-09-03 ENCOUNTER — OFFICE VISIT (OUTPATIENT)
Dept: FAMILY MEDICINE | Facility: CLINIC | Age: 84
End: 2019-09-03
Payer: COMMERCIAL

## 2019-09-03 ENCOUNTER — TELEPHONE (OUTPATIENT)
Dept: FAMILY MEDICINE | Facility: CLINIC | Age: 84
End: 2019-09-03

## 2019-09-03 VITALS
DIASTOLIC BLOOD PRESSURE: 80 MMHG | TEMPERATURE: 97.9 F | HEIGHT: 59 IN | OXYGEN SATURATION: 95 % | RESPIRATION RATE: 20 BRPM | SYSTOLIC BLOOD PRESSURE: 139 MMHG | WEIGHT: 107.8 LBS | HEART RATE: 70 BPM | BODY MASS INDEX: 21.73 KG/M2

## 2019-09-03 DIAGNOSIS — Z72.0 TOBACCO ABUSE: ICD-10-CM

## 2019-09-03 DIAGNOSIS — I10 ESSENTIAL HYPERTENSION WITH GOAL BLOOD PRESSURE LESS THAN 140/90: ICD-10-CM

## 2019-09-03 DIAGNOSIS — R26.89 BALANCE PROBLEMS: ICD-10-CM

## 2019-09-03 DIAGNOSIS — Z00.00 PREVENTATIVE HEALTH CARE: ICD-10-CM

## 2019-09-03 DIAGNOSIS — M51.369 DDD (DEGENERATIVE DISC DISEASE), LUMBAR: ICD-10-CM

## 2019-09-03 DIAGNOSIS — G89.29 CHRONIC BACK PAIN GREATER THAN 3 MONTHS DURATION: ICD-10-CM

## 2019-09-03 DIAGNOSIS — Z00.00 ENCOUNTER FOR MEDICARE ANNUAL WELLNESS EXAM: Primary | ICD-10-CM

## 2019-09-03 DIAGNOSIS — M54.9 CHRONIC BACK PAIN GREATER THAN 3 MONTHS DURATION: ICD-10-CM

## 2019-09-03 DIAGNOSIS — H61.21 IMPACTED CERUMEN OF RIGHT EAR: ICD-10-CM

## 2019-09-03 DIAGNOSIS — I71.40 ABDOMINAL AORTIC ANEURYSM (AAA) WITHOUT RUPTURE (H): ICD-10-CM

## 2019-09-03 DIAGNOSIS — R63.4 WEIGHT LOSS: ICD-10-CM

## 2019-09-03 DIAGNOSIS — M85.80 OSTEOPENIA, UNSPECIFIED LOCATION: ICD-10-CM

## 2019-09-03 DIAGNOSIS — E78.5 HYPERLIPIDEMIA LDL GOAL <130: ICD-10-CM

## 2019-09-03 LAB
ALBUMIN SERPL-MCNC: 4.4 MG/DL (ref 3.5–4.7)
ALP SERPL-CCNC: 95.8 U/L (ref 31.7–110.5)
ALT SERPL-CCNC: 18 U/L (ref 0–45)
AST SERPL-CCNC: 19.4 U/L (ref 0–45)
BILIRUB SERPL-MCNC: 0.4 MG/DL (ref 0.2–1.3)
BUN SERPL-MCNC: 21.4 MG/DL (ref 7–19)
CALCIUM SERPL-MCNC: 10 MG/DL (ref 8.5–10.1)
CHLORIDE SERPLBLD-SCNC: 101.9 MMOL/L (ref 98–110)
CHOLEST SERPL-MCNC: 201.2 MG/DL (ref 0–200)
CHOLEST/HDLC SERPL: 3.4 {RATIO} (ref 0–5)
CO2 SERPL-SCNC: 30.1 MMOL/L (ref 20–32)
CREAT SERPL-MCNC: 0.8 MG/DL (ref 0.5–1)
CREAT UR-MCNC: 124 MG/DL
DEPRECATED CALCIDIOL+CALCIFEROL SERPL-MC: 45 UG/L (ref 20–75)
GFR SERPL CREATININE-BSD FRML MDRD: 73 ML/MIN/1.7 M2
GLUCOSE SERPL-MCNC: 111.5 MG'DL (ref 70–99)
HDLC SERPL-MCNC: 59.6 MG/DL
HEMOGLOBIN: 14.9 G/DL (ref 11.7–15.7)
LDLC SERPL CALC-MCNC: 127 MG/DL (ref 0–129)
MICROALBUMIN UR-MCNC: 40 MG/L
MICROALBUMIN/CREAT UR: 32.5 MG/G CR (ref 0–25)
POTASSIUM SERPL-SCNC: 4.8 MMOL/DL (ref 3.3–4.5)
PROT SERPL-MCNC: 7.4 G/DL (ref 6.8–8.8)
SODIUM SERPL-SCNC: 140.8 MMOL/L (ref 132.6–141.4)
TRIGL SERPL-MCNC: 70.7 MG/DL (ref 0–150)
TSH SERPL DL<=0.005 MIU/L-ACNC: 1.53 MU/L (ref 0.4–4)
VLDL CHOLESTEROL: 14.1 MG/DL (ref 7–32)

## 2019-09-03 RX ORDER — AMLODIPINE BESYLATE 5 MG/1
5 TABLET ORAL DAILY
Qty: 90 TABLET | Refills: 3 | Status: SHIPPED | OUTPATIENT
Start: 2019-09-03 | End: 2020-09-16

## 2019-09-03 RX ORDER — LOSARTAN POTASSIUM 50 MG/1
50 TABLET ORAL DAILY
Qty: 90 TABLET | Refills: 3 | Status: SHIPPED | OUTPATIENT
Start: 2019-09-03 | End: 2020-09-15

## 2019-09-03 ASSESSMENT — MIFFLIN-ST. JEOR: SCORE: 858.57

## 2019-09-03 NOTE — NURSING NOTE
Pt came back to clinic at 1130am for BP recheck after taking medications around 10am. BP was 115/70 with HR 73. PCP notified     Katt Sandoval RN

## 2019-09-03 NOTE — Clinical Note
Please call pt and tell her that I want her to hold her Metoprolol. She should continue Amlodipine and Losartan for BP. Would like her to RTC with me in 2-3 weeks for RN visit for BP recheck. Hoping this will help with balance a bit.LEENA

## 2019-09-03 NOTE — PROGRESS NOTES
Medicare Wellness Visit         HPI     This 83 year old female presents as an established patient  Marybeth Almazan who presents for an subsequent Medicare Wellness Exam.    Patient would like another handicap parking.   Also has questions regarding having an aneurysm check last fall, she never heard any results on it. Patient says she also never got her results from her Dexa scan.   -I was able to show her copies of letters sent last year with test results. Agreed that further results will be communicated by phone.     Patient speaks English and so an  was not used.    Patient Active Problem List   Diagnosis     DDD (degenerative disc disease), lumbar     Diverticulosis of colon     Advanced directives, counseling/discussion     Abdominal aortic aneurysm (H)     Hyperlipidemia LDL goal <130     Tobacco abuse     Calculus of kidney     Seborrheic keratosis     Rotator cuff strain     Right shoulder pain     Essential hypertension with goal blood pressure less than 140/90     Osteopenia     Balance problems     History of shingles     Tubular adenoma of colon       Past Medical History:   Diagnosis Date     Hypertension      NO ACTIVE PROBLEMS         Family History   Problem Relation Age of Onset     Cerebrovascular Disease Mother      Breast Cancer Mother      Hypertension Mother      Arthritis Mother      Eye Disorder Mother      Thyroid Disease Mother      Genitourinary Problems Father         kidney     Diabetes Father      Cancer Father    Family History and past Medical History reviewed and unchanged/updated.       Review of Systems   Constitutional:   fevers, night sweats or unintentional weight change ?  Yes, sometimes      Eyes:   vision change, diplopia or red eyes?  NO      Ears, Nose, Mouth, Throat:   tinnitus or hearing change,  epistaxis or nasal discharge,  oral lesions, throat pain ?  Hearing loss, sometimes trouble hearing    Neck:   stiffness?  NO           Cardiovascular:   chest pain,  palpitations, or pain with walking, orthopnea or PND?  NO   Breasts:  Any bumps or unusual discharge?     NO         Respiratory:   dyspnea, cough, shortness of breath or wheezing?  NO         GI:   nausea, vomiting, diarrhea or constipation,  abdominal pain ?  NO         :   change in urine,  dysuria or hematuria,  sexual dysfunction ?  NO        Musculoskeletal:   joint or muscle pain or swelling?  Yes; back pain ongoing - mid and lower back. Had 3 prior surgeries. Doesn't wake her from sleep. Just aches, worst in am, get's better as she gets up and gets moving            Skin:   concerning lesions or moles?  NO           Nervous System:   loss of strength or sensation,  numbness or tingling,  tremor,  dizziness,  headache?  NO   Endocrine/Hormone:   polyuria or polydipsia,  temperature intolerance?  NO            Blood and Lymphnodes:   concerning bumps,  bleeding problems?  NO            Allergy:   environmental allergies?  NO            Mental Health:   depression or anxiety,  sleep problems?  NO               Medical Care     Have you been to an ER or a hospital in the last year? No  What other specialists or organizations are involved in your medical care?  none         Social History     Social History     Tobacco Use     Smoking status: Current Every Day Smoker     Packs/day: 0.25     Years: 50.00     Pack years: 12.50     Types: Cigarettes     Smokeless tobacco: Never Used   Substance Use Topics     Alcohol use: No     Marital Status:  Who lives in your household? self  Does your home have any of the following safety concerns? Loose rugs in the hallway, no grab bars in the bathroom, no handrails on the stairs or have poorly lit areas? No  Do you feel threatened or controlled by a partner, ex-partner or anyone in your life? No  Has anyone hurt you physically, for example by pushing, hitting, slapping or kicking you or forcing you to have sex? No  Do you need help with the phone, transportation,  shopping, preparing meals, housework, laundry, medications or managing money? No   Have you noticed any hearing difficulties? Yes-sometimes      Risk Behaviors and Healthy Habits     How many servings of fruits and vegetables do you eat a day? 2-3  How often do you exercise and what do you do? Occasionally around apartment - does own laundry, shopping, some cooking, cleaning. Takes out trash  Do you frequently ride without a seatbelt? No  Do you use tobacco?  Yes 1-3 daily  Do you use any other drugs? No         Do you use alcohol?Yes  Number of drinks per day 0  Number of drinking days a week 1    Sexual Health     Are you sexually active?  No   If yes, with men, women, or both? N/A  If yes, do you more than one current partner? N/A  If yes, are you using condoms?  N/A  Have you had any sexually transmitted infections? N/A   Any sexual concerns? N/A     FOR WOMEN  What year did you stop having periods? 1984 roughly  Any vaginal bleeding in the last year? No  Have you ever had an abnormal Pap smear? No    FUNCTIONAL ABILITY/SAFETY SCREENING     Was the patient's timed Up & Go test (Get up from chair walk, 10 feet turn, return to chair and sit down) unsteady or longer than 30 seconds? No    Hearing evaluation if done: No    EVALUATION OF COGNITIVE FUNCTION   Today's PHQ-2 Score: 0    Mood/affect:Normal  Appearance:Normal  Family member/caregiver input: Normal  Mini Cog Scoring   3 points   Clock Draw Test result:  Normal    SCREENING FOR PREVENTION and EARLY DETECTION     ECG (if done)not performed    Corrected Visual acuity:   Screening Lipid Level (covered every 5 years ): Recommended and patient accepted testing.  The ASCVD Risk score (Globe VALENTINA Jr., et al., 2013) failed to calculate for the following reasons:    The 2013 ASCVD risk score is only valid for ages 40 to 79  ASA use (>3% risk in 5 y):  Recommended and patient accepted testing.  US for AAA (65-74 yo+ever smoked):  Recommended and patient declined  "testing.    CV Risk based on Pooled Cohort Risk: aged out    Advanced Directives: Discussed and patient desires to be DNR/DNI. Advanced directives already completed and in epic.    Immunization History   Administered Date(s) Administered     FLU 6-35 months 02/07/2005     Flu, Unspecified 11/13/1998, 11/03/2000, 11/14/2001     Influenza (IIV3) PF 10/15/1999, 10/17/2003, 11/08/2005, 11/15/2007, 11/18/2008, 10/30/2011, 09/27/2012     Influenza Vaccine IM > 6 months Valent IIV4 09/01/2013     Pneumo Conj 13-V (2010&after) 12/04/2015     Pneumococcal 23 valent 10/15/1999, 11/14/2001, 06/30/2006     Poliovirus, inactivated (IPV) 02/02/2012     TD (ADULT, 7+) 11/15/2007, 11/18/2008     Td (Adult), Adsorbed 07/25/1997     Twinrix A/B 02/02/2012, 03/05/2012     Typhoid IM 02/02/2012     Zoster vaccine, live 08/15/2017     Reviewed Immunization Record Today  Varicella Vaccine: Declined           Physical Exam     Vitals: /80 (BP Location: Left arm, Patient Position: Sitting, Cuff Size: Adult Regular)   Pulse 70   Temp 97.9  F (36.6  C) (Oral)   Resp 20   Ht 1.505 m (4' 11.25\")   Wt 48.9 kg (107 lb 12.8 oz)   SpO2 95%   BMI 21.59 kg/m    BMI= Body mass index is 21.59 kg/m .  GENERAL APPEARANCE: healthy, alert and no distress. Thin  EYES: Eyes grossly normal to inspection, PERRL and conjunctivae and sclerae normal  HENT: R ear with cerumen impaction, nose and mouth without ulcers or lesions, oropharynx clear and oral mucous membranes moist. Partial dentures  NECK: no adenopathy, no asymmetry, masses, or scars and thyroid normal to palpation  RESP: lungs clear to auscultation - no rales, rhonchi or wheezes  CV: regular rate and rhythm, normal S1 S2, no S3 or S4, no murmur, click or rub, no peripheral edema and peripheral pulses weakly present  ABDOMEN: soft, nontender, no hepatosplenomegaly, no masses and bowel sounds normal  MS: no musculoskeletal defects are noted and gait is age appropriate without ataxia  SKIN: " no suspicious lesions or rashes  NEURO: Normal strength and tone, sensory exam grossly normal, mentation intact and speech normal  PSYCH: mentation appears normal and affect normal        Assessment and Plan     Encounter for subsequent Medicare annual wellness exam  Reviewed [facility] Preventive Services form with patient and preventive service plan is as below.    Balance problems  -     [facility] REFERRAL; Future - PT  -     Home Care Nursing Referral ([place]) - for home safety check/falls precautions    Essential hypertension with goal blood pressure less than 140/90  Within goal range despite not taking meds this morning.   Suspect balance issues could be related to overtreatment/hypotension.  Agrees to RTC later today after taking meds for BP recheck. Med modifications pending result.   Does not want to have BP cuff at home.  -     Albumin Random Urine Quantitative with Creat Ratio  -     Comprehensive Metabolic Panel ([facility])    Hyperlipidemia LDL goal <130, no statin use  -     Lipid Panel ([facility])    Abdominal aortic aneurysm (AAA) without rupture (H)  -     [facility] NOTIFICATION  -     US Abdominal Aorta Limited; Future - declined repeat    Tobacco abuse        -     Declines smoking cessation assist        -     Denies cough, SOB. Declines spirometry to assess for COPD    DDD (degenerative disc disease), lumbar  -     [facility] - agrees to consult to discuss injections    Osteopenia, unspecified location  -     Vitamin D Deficiency  -     Cont vit d. Declines Ca supplement given prior hx kidney stones    Chronic back pain greater than 3 months duration  -     [facility] REFERRAL - would like to hear options re local injections vs epidural. Does not want to consider any further surgeries.    Weight loss  Low appetite  Denies low mood  -     TSH  -     Hemoglobin (HGB) ([facility])  -     CMP      Options for treatment and follow-up  care were reviewed with the Isabell Cisse and/or guardian engaged in the decision making process and verbalized understanding of the options discussed and agreed with the final plan.    ADDENDUM  BP recheck 2 hours after taking meds is 115/70. Will have her hold Metoprolol. Continue Losartan and Amlodipine. RTC for BP recheck in 2-4 weeks. Consider restart low dose B blocker for cardiac protection depending on BP at recheck

## 2019-09-03 NOTE — TELEPHONE ENCOUNTER
"Called and spoke with the patient to relay provider's message-Per PCP, \"Hold  Metoprolol. should continue Amlodipine and Losartan for BP. Return to clinic to see provider in 2-3 weeks\", MD Norah.    All pertinent information given, notified to call back the clinic if having any abnormal symptoms, Patient verbalized understanding and agreed with the plan-Follow up appointment scheduled on 09/19/2019. Message routed to PCP as Tavia De Santiago RN    "

## 2019-09-03 NOTE — PATIENT INSTRUCTIONS
Personalized Prevention Plan  You are due for the preventive services outlined below.  Your care team is available to assist you in scheduling these services.  If you have already completed any of these items, please share that information with your care team to update in your medical record.  Health Maintenance Due   Topic Date Due     Zoster (Shingles) Vaccine (2 of 3) 10/10/2017     Diptheria Tetanus Pertussis (DTAP/TDAP/TD) Vaccine (2 - Td) 11/18/2018     PHQ-2  01/01/2019     Cholesterol Lab  08/29/2019     FALL RISK ASSESSMENT  08/29/2019     Flu Vaccine (1) 09/01/2019     Annual Wellness Visit  08/29/2019       [facility]  [phone]  Referral auto sent to , they will review and contact patient/family directly to set up.    [facility]    Hello,   Patient is scheduled in the Pain Clinic on 10/24/19 at 9:20am with Dr. Santos. Have a great day!      Thanks,   Katharina

## 2019-09-04 ENCOUNTER — DOCUMENTATION ONLY (OUTPATIENT)
Dept: FAMILY MEDICINE | Facility: CLINIC | Age: 84
End: 2019-09-04

## 2019-09-04 NOTE — PROGRESS NOTES
"[facility] now requests orders and shares plan of care/discharge summaries for some patients through [facility].  Thank you for your assistance in improving collaboration for our patients.    MD SUMMARY/PLAN OF CARE  Patient was seen for PT SOC/eval today at her home.  Her dtr, Concetta Fabian, was also present for the visit.    PCP will receive full documentation of today's visit on the 485 Certification form that will eventually be faxed, but wanted to provide a brief summary of the visit as Isabell is not eligible for additional home care visits as she is not homebound.  She could be referred for OP PT, but I don't think it is really needed at this time.    ROM was good, strength was good (4+-5/5) except in her core and hips (4/5).  Her balance was very good for her age.  She scored a perfect 24/24 on the Tinetti Gait and Balance Assessment.  Her home is relatively safe.    Recommendations made:  1.  Drink plenty of fluids t/o the day to keep your blood pressure up and risk of orthostatic hypotension down (she did report one episode of this a few years ago which she said she never told the doctor about).  2.  Get another bottle of magnesium (she was out) as it may help with the mm cramps you experience.  3.  Take up as many of the small scatter rugs in your home as possible as these are a big tripping hazard.  4.  Place a 2-3\" thick book under your feet when sitting in your easy chair (be sure to kick out of way when getting in and out of chair), so legs aren't dangling and pulling spine into hyperextension.  5.  Get a clamp-on grab bar for the side of the tub so you have something to hold onto when stepping in and out of tub or when getting down into the tub.  6.  When out for your walk or just walking around the house, try to be concious of tightening your abdominals to help brace your spine.  7.  Go to Silver Sneakers classes at the [facility] or [facility] near your house.  You are a great candidate for " them, and they are free with your health plan.    Tereso Aguirre PT  [phone]  [email or web address]

## 2019-09-19 ENCOUNTER — OFFICE VISIT (OUTPATIENT)
Dept: FAMILY MEDICINE | Facility: CLINIC | Age: 84
End: 2019-09-19
Payer: COMMERCIAL

## 2019-09-19 VITALS
HEIGHT: 60 IN | WEIGHT: 108.6 LBS | BODY MASS INDEX: 21.32 KG/M2 | SYSTOLIC BLOOD PRESSURE: 130 MMHG | TEMPERATURE: 98.2 F | OXYGEN SATURATION: 95 % | HEART RATE: 94 BPM | DIASTOLIC BLOOD PRESSURE: 85 MMHG | RESPIRATION RATE: 16 BRPM

## 2019-09-19 DIAGNOSIS — I10 ESSENTIAL HYPERTENSION WITH GOAL BLOOD PRESSURE LESS THAN 140/90: Primary | ICD-10-CM

## 2019-09-19 DIAGNOSIS — Z72.0 TOBACCO ABUSE: ICD-10-CM

## 2019-09-19 RX ORDER — ACETAMINOPHEN 325 MG/1
325-650 TABLET ORAL EVERY 6 HOURS PRN
COMMUNITY

## 2019-09-19 RX ORDER — ZINC GLUCONATE 50 MG
50 TABLET ORAL DAILY
COMMUNITY

## 2019-09-19 ASSESSMENT — MIFFLIN-ST. JEOR: SCORE: 874.11

## 2019-09-19 NOTE — PATIENT INSTRUCTIONS
Here is the plan from today's visit    Please check your blood pressure and heart rate 3 times a week. We will check in by phone in 2 weeks    Thank you for coming to [facility] today.  Lab Testing:  **If you had lab testing today and your results are reassuring or normal they will be mailed to you or sent through [employer] within 7 days.   **If the lab tests need quick action we will call you with the results.  The phone number we will call with results is # [phone] (home) . If this is not the best number please call our clinic and change the number.  Medication Refills:  If you need any refills please call your pharmacy and they will contact us.   If you need to  your refill at a new pharmacy, please contact the new pharmacy directly. The new pharmacy will help you get your medications transferred faster.   Scheduling:  If you have any concerns about today's visit or wish to schedule another appointment please call our office during normal business hours [phone] (8-5:00 M-F)  If a referral was made to a [facility] and you don't get a call from central scheduling please call [phone].  If a Mammogram was ordered for you at [facility] call [phone] to schedule or change your appointment.  If you had an XRay/CT/Ultrasound/MRI ordered the number is [phone] to schedule or change your radiology appointment.   Medical Concerns:  If you have urgent medical concerns please call [phone] at any time of the day.    Marybeth Almazan MD

## 2019-09-19 NOTE — Clinical Note
Can you please plan to call pt on 10/3/19 for 2 week recheck of BP and HR. She is keeping a diary for me. Zeynep

## 2019-09-19 NOTE — PROGRESS NOTES
HPI       Isabell Cisse is a 83 year old  who presents for   Chief Complaint   Patient presents with     Follow Up     BP, would like to go over results from labs from last visit, otherwise no concerns per pt.     Recheck after holding Metoprolol since 9/3 visit, at which time she was normotensive without having taken her am meds and was reporting some issues with balance - not really dizziness. Says she feels more steady since stopping med.     In interval since last visit, home RN has been out for falls prevention visit. Has also been referred to PT for balance eval. No prior h/o falls. Lives alone, but daughter very close.    ++++++    Problem, Medication and Allergy Lists were reviewed.    Patient is an established patient of this clinic.         Review of Systems:   Review of Systems   Constitutional: Negative.    Respiratory: Negative.    Cardiovascular: Negative.    Neurological: Negative for dizziness, syncope, weakness and light-headedness.            Physical Exam:     Vitals:    09/19/19 1415   BP: 130/85   BP Location: Left arm   Patient Position: Sitting   Cuff Size: Adult Regular   Pulse: 94   Resp: 16   Temp: 98.2  F (36.8  C)   TempSrc: Oral   SpO2: 95%   Weight: 49.3 kg (108 lb 9.6 oz)   Height: 1.524 m (5')     Body mass index is 21.21 kg/m .  Vitals were reviewed and were normal     Physical Exam  Constitutional:       General: She is not in acute distress.     Appearance: She is not ill-appearing.   Cardiovascular:      Rate and Rhythm: Regular rhythm. Tachycardia present.      Heart sounds: No murmur.   Musculoskeletal:      Right lower leg: No edema.      Left lower leg: No edema.   Neurological:      Mental Status: She is alert.   Psychiatric:         Mood and Affect: Mood normal.           Results:   reviewed    Assessment and Plan      Isabell was seen today for follow-up of following issues:    Essential hypertension with goal blood pressure less than 140/90  - ambulatory blood  pressure and heart rate checks 3 times a week with phone follow-up in 2 weeks.   - Continue Losartan and Amlodipine at current doses. Mildly tachy today, will add back lower dose beta blocker for cardiac protection if able to tolerate    Tobacco abuse  - declines intervention    AAA  - declines further imaging to follow. Was smaller at last check.    Osteopenia  Fall/fracture prevention  - won't take Ca due to prior kidney stones. Is taking vit D  - not really interested in adding med like bisphosphonate, despite detained discussion of fracture risk given age, phenotype, smoking. Will revisit.  - home safety eval complete. Declines life line. Hasn't scheduled with balance PT.    Other orders  -     TDAP VACCINE (BOOSTRIX)  -     Referred for zoster vaccine         There are no discontinued medications.    Options for treatment and follow-up care were reviewed with the patient. Isabell Cisse  engaged in the decision making process and verbalized understanding of the options discussed and agreed with the final plan.    Marybeth Almazan MD

## 2019-09-23 ASSESSMENT — ENCOUNTER SYMPTOMS
CONSTITUTIONAL NEGATIVE: 1
DIZZINESS: 0
RESPIRATORY NEGATIVE: 1
WEAKNESS: 0
LIGHT-HEADEDNESS: 0
CARDIOVASCULAR NEGATIVE: 1

## 2019-10-24 ENCOUNTER — ANCILLARY PROCEDURE (OUTPATIENT)
Dept: GENERAL RADIOLOGY | Facility: CLINIC | Age: 84
End: 2019-10-24
Attending: ANESTHESIOLOGY
Payer: COMMERCIAL

## 2019-10-24 ENCOUNTER — OFFICE VISIT (OUTPATIENT)
Dept: ANESTHESIOLOGY | Facility: CLINIC | Age: 84
End: 2019-10-24
Attending: FAMILY MEDICINE
Payer: COMMERCIAL

## 2019-10-24 VITALS
SYSTOLIC BLOOD PRESSURE: 123 MMHG | BODY MASS INDEX: 21.01 KG/M2 | RESPIRATION RATE: 16 BRPM | HEIGHT: 60 IN | DIASTOLIC BLOOD PRESSURE: 81 MMHG | WEIGHT: 107 LBS | HEART RATE: 99 BPM

## 2019-10-24 DIAGNOSIS — M47.816 LUMBAR SPONDYLOSIS: ICD-10-CM

## 2019-10-24 DIAGNOSIS — M47.816 LUMBAR SPONDYLOSIS: Primary | ICD-10-CM

## 2019-10-24 ASSESSMENT — ANXIETY QUESTIONNAIRES
7. FEELING AFRAID AS IF SOMETHING AWFUL MIGHT HAPPEN: NOT AT ALL
5. BEING SO RESTLESS THAT IT IS HARD TO SIT STILL: NOT AT ALL
3. WORRYING TOO MUCH ABOUT DIFFERENT THINGS: NOT AT ALL
GAD7 TOTAL SCORE: 0
2. NOT BEING ABLE TO STOP OR CONTROL WORRYING: NOT AT ALL
GAD7 TOTAL SCORE: 0
1. FEELING NERVOUS, ANXIOUS, OR ON EDGE: NOT AT ALL
4. TROUBLE RELAXING: NOT AT ALL
6. BECOMING EASILY ANNOYED OR IRRITABLE: NOT AT ALL
7. FEELING AFRAID AS IF SOMETHING AWFUL MIGHT HAPPEN: NOT AT ALL

## 2019-10-24 ASSESSMENT — ENCOUNTER SYMPTOMS
LIGHT-HEADEDNESS: 0
MUSCLE CRAMPS: 1
PALPITATIONS: 0
SLEEP DISTURBANCES DUE TO BREATHING: 0
LEG PAIN: 1
ORTHOPNEA: 0
NECK PAIN: 1
SYNCOPE: 0
ARTHRALGIAS: 0
JOINT SWELLING: 0
BACK PAIN: 1
HYPOTENSION: 1
HYPERTENSION: 1

## 2019-10-24 ASSESSMENT — PAIN SCALES - GENERAL: PAINLEVEL: SEVERE PAIN (7)

## 2019-10-24 ASSESSMENT — MIFFLIN-ST. JEOR: SCORE: 861.85

## 2019-10-24 NOTE — PROGRESS NOTES
ATTENDING ATTESTATION:  I have seen and examined the patient with the pain medicine fellow and agree with the history, examination, assessment, plan as noted below.    Patient is a 84-year-old female with history of L3-S1 posterior fusion who presents with chronic low axial back pain.  Her pain is currently central but is slightly more left-sided than right.  History and exam are not pathognomonic for any one particular process, she is not interested in medication treatment or conservative therapies including physical therapy or pain psychology.  Because there is absence of recent imaging of her spine, we will defer procedures until imaging to better evaluate for the feasibility of interventions.  Lumbar x-ray was ordered and completed on the day of her visit which displays intact lumbosacral fusion with extensive bony remodeling of the lower lumbar spine.  As result this is likely not a feasible target for intervention, however she does have degenerative changes of the lumbar levels mild effusion in the above the fusion of the sacroiliac joints bilaterally.  Either of these sites may be reasonable targets for intervention going forward.  Due to the extensive changes observed on x-ray, spoke with the patient regarding potential contribution of these bony changes to her overall pain picture, and will obtain lumbar spine CT scan to better characterize these changes before proceeding with any intervention.    Jairo Daniel MD    Department of Anesthesiology  Pain Management Division    [facility]  Pain Clinic Evaluation    CC: chronic low back pain    History of present illness:     This is a 84 year old female with PMH of osteopenia who presents for evaluation of chronic back pain. She was sent by her PCP.  Patient endorses back pain since 1980s.  She has had 3 back surgeries starting in 1998, around 5 years later and in 2011.  She only endorses axial back pain. Overall it is getting  worse.  It is a pretty wide area in the center of her back.  It is described as aching. It is worse in the morning.  It gets a bit better when she starts moving around.  It  Is  Worse with prolonged walking.  It takes a few minutes before the pain kicks in after walking. She uses aspecreme 1-3 tiimes per day.   She used to use a TENS unit with good relief but causes her skin to break out.  She takes advil, tylenol, aleve a few times a day. She has not had a course of physical therapy in the past.      Red Flags:  -Denies Falls or balance difficulty  -Denies Bowel/Bladder Changes  -Denies Saddle Anesthesia  -Denies Fever/IV Drug Use  -Denies Unexplained Weight Loss  -Denies previous cancer history  -Denies progressive weakness     Previous Treatments have included:  #Medications: N/A  #Therapy: Did PT in the past. Patient is very active throughout the day.  #Acupuncture: No  #Chiropractic Manipulation: 1980s  #Interventions/Procedures: Nothing in the recent  #Surgery: Years ago    Tobacco Use     Smoking status: Current Every Day Smoker       Packs/day: 0.25       Years: 50.00       Pack years: 12.50       Types: Cigarettes     Smokeless tobacco: Never Used   Substance Use Topics     Alcohol use: No      Marital Status:  Who lives in your household? self      Past Medical History:  Past Medical History:   Diagnosis Date     Hypertension      NO ACTIVE PROBLEMS        Imaging:  N/A    Medications:  Current Outpatient Medications   Medication Sig Dispense Refill     acetaminophen (TYLENOL) 325 MG tablet Take 325-650 mg by mouth every 6 hours as needed for mild pain       amLODIPine (NORVASC) 5 MG tablet Take 1 tablet (5 mg) by mouth daily - OFFER BPGAP 90 tablet 3     aspirin (ASA) 81 MG tablet Take 1 tablet (81 mg) by mouth daily Patient takes occasionally 90 tablet 3     losartan (COZAAR) 50 MG tablet Take 1 tablet (50 mg) by mouth daily - OFFER BPGAP 90 tablet 3     LYSINE PO Take by mouth as needed        MAGNESIUM PO        MULTI-VITAMIN OR TABS 1 TABLET DAILY       VITAMIN C OR 1 tablet daily       VITAMIN D OR 1 tablet daily       Vitamin E (TOCOPHEROL) 1000 UNIT capsule Take 1,000 Units by mouth daily.       zinc gluconate 50 MG tablet Take 50 mg by mouth daily         Allergies:  Allergies   Allergen Reactions     Celebrex [Celecoxib] Nausea and Vomiting     Codeine Nausea         Answers for HPI/ROS submitted by the patient on 10/24/2019   DAMARIS 7 TOTAL SCORE: 0  General Symptoms: No  Skin Symptoms: No  HENT Symptoms: No  EYE SYMPTOMS: No  HEART SYMPTOMS: Yes  LUNG SYMPTOMS: No  INTESTINAL SYMPTOMS: No  URINARY SYMPTOMS: No  GYNECOLOGIC SYMPTOMS: No  BREAST SYMPTOMS: No  SKELETAL SYMPTOMS: Yes  BLOOD SYMPTOMS: No  NERVOUS SYSTEM SYMPTOMS: No  MENTAL HEALTH SYMPTOMS: No  Chest pain or pressure: No  Fast or irregular heartbeat: No  Pain in legs with walking: Yes  Trouble breathing while lying down: No  Fingers or toes appear blue: No  High blood pressure: Yes  Low blood pressure: Yes  Fainting: No  Murmurs: No  Pacemaker: No  Varicose veins: Yes  Edema or swelling: No  Wake up at night with shortness of breath: No  Light-headedness: No  Back pain: Yes  Neck pain: Yes  Swollen joints: No  Joint pain: No  Bone pain: Yes  Muscle cramps: Yes      Exam:  General: No acute distress, sitting comfortably   Inspection: no deformities of BUE/BLE; no evidence of scoliosis, symmetric positioning of bilateral shoulders and iliac crests   Palpation: tenderness to palpation along upper lumbar, lower lumbar facets.  Mild tenderness to palpation along bilateral paraspinal musculature. No SI joint tenderness  ROM:   LUMBAR RANGE OF MOTION:  - Flexion: mildly limited, mild pain  - Extension: moderately limited, moderate pain  - Lateral bending: mildly limited, moderate, severe  Strength: 5/5 in bilateral L2-S1 myotomes  Sensation: Intact to light touch bilaterally along L3, L4, L5, S1, S2.  Reflexes: 2+ in bilateral patella and  achilles  Special tests:   Negative: bilateral SLR, hip scour, FADIR, JONATHON, SI distraction, sacral thrust, Yeoman's, manny, Gaenslen   Psych: Normal mood and affect      Assessment:      84 F with PMH of osteopenia presents for evaluation of low back pain.  This pain has been present for many years now.  Patient is s/p multiple back surgeries which includes a fusion.  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         Plan:      Additional Work-up:    - Will obtain xray of the lumbar spine to evaluate for facet arthropathy, other bony pathology  Therapy/Exercise:    - Encouraged patient to start a formal physical therapy regimen for core strengthening, paraspinal stretching, home exercise program. Patient states that she is not interested in this.   Medications:    - Continue with prn tylenol, advil, aleve.  Patient counseled about maximum medication dosages per day.  No new medications at this time.   Interventions:    - After xray obtained, will likely proceed with bilateral lumbar medial branch blocks.      Follow-up:  Follow up in procedure clinic for medial branch blocks, radiofrequency ablation if significant relief is noted with medial branch blocks. Patient can follow up in clinic 6 weeks after the RFA.     Patient was seen, examined and management discussed with attending physician  Dr. Tom Gibbons MD  10/24/2019 9:13 AM  Pain Medicine Fellow

## 2019-10-24 NOTE — NURSING NOTE
LPN reviewed AVS with Pt.  Pt verbalized an understanding of information, and was asked to contact clinic with questions.    LPN read through the instructions with pt for the recommended procedure: Bilateral Lumbar Medial Branch Blocks.   Pt verbalized understanding to holding appropriate medication per protocol, and was agreeable to NPO policy and needing a .    Paola Lewis LPN

## 2019-10-24 NOTE — LETTER
10/24/2019       RE: Isabell Cisse  [address]  [facility] [address]     Dear Colleague,    Thank you for referring your patient, Isabell Cisse, to the [facility] FOR COMPREHENSIVE PAIN MANAGEMENT at [facility]. Please see a copy of my visit note below.    ATTENDING ATTESTATION:  I have seen and examined the patient with the pain medicine fellow and agree with the history, examination, assessment, plan as noted below.    Patient is a 84-year-old female with history of L3-S1 posterior fusion who presents with chronic low axial back pain.  Her pain is currently central but is slightly more left-sided than right.  History and exam are not pathognomonic for any one particular process, she is not interested in medication treatment or conservative therapies including physical therapy or pain psychology.  Because there is absence of recent imaging of her spine, we will defer procedures until imaging to better evaluate for the feasibility of interventions.  Lumbar x-ray was ordered and completed on the day of her visit which displays intact lumbosacral fusion with extensive bony remodeling of the lower lumbar spine.  As result this is likely not a feasible target for intervention, however she does have degenerative changes of the lumbar levels mild effusion in the above the fusion of the sacroiliac joints bilaterally.  Either of these sites may be reasonable targets for intervention going forward.  Due to the extensive changes observed on x-ray, spoke with the patient regarding potential contribution of these bony changes to her overall pain picture, and will obtain lumbar spine CT scan to better characterize these changes before proceeding with any intervention.    Jairo Daniel MD    Department of Anesthesiology  Pain Management Division    [facility]  Pain Clinic Evaluation    CC: chronic low back pain    History of present illness:      This is a 84 year old female with PMH of osteopenia who presents for evaluation of chronic back pain. She was sent by her PCP.  Patient endorses back pain since 1980s.  She has had 3 back surgeries starting in 1998, around 5 years later and in 2011.  She only endorses axial back pain. Overall it is getting worse.  It is a pretty wide area in the center of her back.  It is described as aching. It is worse in the morning.  It gets a bit better when she starts moving around.  It  Is  Worse with prolonged walking.  It takes a few minutes before the pain kicks in after walking. She uses aspecreme 1-3 tiimes per day.   She used to use a TENS unit with good relief but causes her skin to break out.  She takes advil, tylenol, aleve a few times a day. She has not had a course of physical therapy in the past.      Red Flags:  -Denies Falls or balance difficulty  -Denies Bowel/Bladder Changes  -Denies Saddle Anesthesia  -Denies Fever/IV Drug Use  -Denies Unexplained Weight Loss  -Denies previous cancer history  -Denies progressive weakness     Previous Treatments have included:  #Medications: N/A  #Therapy: Did PT in the past. Patient is very active throughout the day.  #Acupuncture: No  #Chiropractic Manipulation: 1980s  #Interventions/Procedures: Nothing in the recent  #Surgery: Years ago    Tobacco Use     Smoking status: Current Every Day Smoker       Packs/day: 0.25       Years: 50.00       Pack years: 12.50       Types: Cigarettes     Smokeless tobacco: Never Used   Substance Use Topics     Alcohol use: No      Marital Status:  Who lives in your household? self      Past Medical History:  Past Medical History:   Diagnosis Date     Hypertension      NO ACTIVE PROBLEMS        Imaging:  N/A    Medications:  Current Outpatient Medications   Medication Sig Dispense Refill     acetaminophen (TYLENOL) 325 MG tablet Take 325-650 mg by mouth every 6 hours as needed for mild pain       amLODIPine (NORVASC) 5 MG tablet  Take 1 tablet (5 mg) by mouth daily - OFFER BPGAP 90 tablet 3     aspirin (ASA) 81 MG tablet Take 1 tablet (81 mg) by mouth daily Patient takes occasionally 90 tablet 3     losartan (COZAAR) 50 MG tablet Take 1 tablet (50 mg) by mouth daily - OFFER BPGAP 90 tablet 3     LYSINE PO Take by mouth as needed       MAGNESIUM PO        MULTI-VITAMIN OR TABS 1 TABLET DAILY       VITAMIN C OR 1 tablet daily       VITAMIN D OR 1 tablet daily       Vitamin E (TOCOPHEROL) 1000 UNIT capsule Take 1,000 Units by mouth daily.       zinc gluconate 50 MG tablet Take 50 mg by mouth daily         Allergies:  Allergies   Allergen Reactions     Celebrex [Celecoxib] Nausea and Vomiting     Codeine Nausea       Exam:  General: No acute distress, sitting comfortably   Inspection: no deformities of BUE/BLE; no evidence of scoliosis, symmetric positioning of bilateral shoulders and iliac crests   Palpation: tenderness to palpation along upper lumbar, lower lumbar facets.  Mild tenderness to palpation along bilateral paraspinal musculature. No SI joint tenderness  ROM:   LUMBAR RANGE OF MOTION:  - Flexion: mildly limited, mild pain  - Extension: moderately limited, moderate pain  - Lateral bending: mildly limited, moderate, severe  Strength: 5/5 in bilateral L2-S1 myotomes  Sensation: Intact to light touch bilaterally along L3, L4, L5, S1, S2.  Reflexes: 2+ in bilateral patella and achilles  Special tests:   Negative: bilateral SLR, hip scour, FADIR, JONATHON, SI distraction, sacral thrust, Yeoman's, manny, Gaenslen   Psych: Normal mood and affect      Assessment:      84 F with PMH of osteopenia presents for evaluation of low back pain.  This pain has been present for many years now.  Patient is s/p multiple back surgeries which includes a fusion.  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         Plan:      Additional Work-up:    - Will obtain xray of  the lumbar spine to evaluate for facet arthropathy, other bony pathology  Therapy/Exercise:    - Encouraged patient to start a formal physical therapy regimen for core strengthening, paraspinal stretching, home exercise program. Patient states that she is not interested in this.   Medications:    - Continue with prn tylenol, advil, aleve.  Patient counseled about maximum medication dosages per day.  No new medications at this time.   Interventions:    - After xray obtained, will likely proceed with bilateral lumbar medial branch blocks.      Follow-up:  Follow up in procedure clinic for medial branch blocks, radiofrequency ablation if significant relief is noted with medial branch blocks. Patient can follow up in clinic 6 weeks after the RFA.     Patient was seen, examined and management discussed with attending physician  Dr. Tom Gibbons MD  10/24/2019 9:13 AM  Pain Medicine Fellow    Again, thank you for allowing me to participate in the care of your patient.      Sincerely,    Jairo Santos MD

## 2019-10-24 NOTE — PATIENT INSTRUCTIONS
1. Call to schedule your Lumbar X Rays.     2. Procedure ordered- Bilateral Lumbar Medial Branch Blocks.     Please call [phone] to schedule, reschedule, or cancel your procedure appointment.   Phones are answered Monday - Friday from 7:30 - 4:00pm.  Leave a voicemail with your name, birth date, and phone number if no one is available to take your call.     Your procedure: Bilateral Lumbar Medial Branch Blocks.     On the day of the procedure  1. Arrive 1 hour earlier than your scheduled time, to the [facility]  Address: [address] [place] [id number]  2. Check in on the 5th floor for your procedure    For your diagnostic procedure, please fax in your Pain Diary.  Our fax number is [phone]    If you must reschedule your procedure more than two times, you must follow up in clinic before rescheduling again.        Preparing for your procedure    CAUTION - FAILURE TO FOLLOW THESE PRE-PROCEDURE INSTRUCTIONS WILL RESULT IN YOUR PROCEDURE BEING RESCHEDULED.            You must have a  take you home after your procedure. Transportation by taxi or para-transit is okay as long as you have a responsible adult accompany you. You must provide your 's full name and contact number at time of check in.     Fasting Protocol You may have NOTHING SOLID TO EAT 8 HOURS prior to arrival at the procedure area.     You may have CLEAR LIQUIDS UP TO 2 HOURS prior to arrival.    Broth and candy are considered solid food and require an eight hour fast.     Clear liquids include water, clear fruit juice (no pulp), carbonated beverages, ice, black coffee, black tea, clear jello. No alcohol containing beverages.   Medications If you take any medications, DO NOT STOP. Take your medications as usual the day of your procedure with a sip of water AT LEAST 2 HOURS PRIOR TO ARRIVAL.    Antibiotics If you are currently taking antibiotics, you must complete the entire dose 7 days prior to your  scheduled procedure. You must be clear of any signs or symptoms of infection. If you begin antibiotics, please contact our clinic for instructions.     Fever, Chills, or Rash If you experience a fever of higher than 100 degrees, chills, rash, or open wounds during the one week before your procedure, please call the clinic to see if you may proceed with your procedure.      Medication Hold List  **Patients under Cardiology/Neurology care should consult their provider prior to the pain procedure to verify pre-procedure medication instructions. The information below contains general guidelines.**    Blood Thinners If you are taking daily ASPIRIN, PLAVIX, OR OTHER BLOOD THINNERS SUCH AS COUMADIN/WARFARIN, we will need your prescribing doctor to sign a release permitting you to stop these medications. Once approved by your prescribing doctor - STOP ALL BLOOD THINNERS BASED ON THE TIME TABLE BELOW PRIOR TO YOUR PROCEDURE. If you have been instructed to stop WARFARIN(COUMADIN), you must have an INR lab drawn the day before your procedure. . Your INR must be within normal limits before we can perform your injection. MEDICATIONS CAN BE RESTARTED AFTER YOUR PROCEDURE.    14 DAY HOLD  Ticlid (ticlopidine)    10 DAY HOLD  Effient (Prasugel)    3 DAY HOLD  Xarelto (rivaroxaban) 7 DAY HOLD  Anacin, Bufferin, Ecotrin, Excedrin, Aggrenox (Aspirin)  Brilinta (ticagrelor)  Coumadin (Warfarin)  Pradexa (Dabigatran)  Elmiron (Pentosan)  Plavix (Clopidogrel Bisulfate)  Pletal (Cilostazol)    24 HOUR HOLD  Lovenox (enoxaparin)  Agrylin (Anagrelide)        Non-steroidal Anti-inflammatories (NSAIDs) DO NOT TAKE any non-steroidal anti-inflammatory medications (NSAIDs) listed on the table below. MEDICATIONS CAN BE RESTARTED AFTER YOUR PROCEDURE. Celebrex is OK to take and does not need to be discontinued.     Medications to stop:  3 DAY HOLD  Advil, Motrin (Ibuprofen)  Arthrotec (diciofenac sodium/misoprostol)  Clinoril (Sulindac)  Indocin  (Indomethacin)  Lodine (Etodolac)  Toradol (Ketorolac)  Vicoprofen (Hydrocodone and Ibuprofen)  Voltaren (Diclotenac)    14 DAY HOLD  Daypro (Oxaprozin)  Feldene (Piroxicam)   7 DAY HOLD  Aleve (Naproxen sodium)  Darvon compound (contains aspirin)  Naprosyn (Naproxen)  Norgesic Forte (contains aspirin)  Mobic (Meloxicam)  Oruvall (Ketoprofen)  Percodan (contains aspirin)  Relafen (Nabumetone)  Salsalate  Trilisate  Vitamin E (more than 400 mg per day)  Any medication containing aspirin                To speak with a nurse, schedule/reschedule/cancel a clinic appointment, or request a medication refill call: [phone]     You can also reach us by [employer]: [email or web address]

## 2019-10-25 ASSESSMENT — ANXIETY QUESTIONNAIRES: GAD7 TOTAL SCORE: 0

## 2019-11-04 ENCOUNTER — ANCILLARY PROCEDURE (OUTPATIENT)
Dept: CT IMAGING | Facility: CLINIC | Age: 84
End: 2019-11-04
Attending: ANESTHESIOLOGY
Payer: COMMERCIAL

## 2019-11-04 DIAGNOSIS — M47.816 LUMBAR SPONDYLOSIS: ICD-10-CM

## 2019-11-07 ENCOUNTER — TELEPHONE (OUTPATIENT)
Dept: FAMILY MEDICINE | Facility: CLINIC | Age: 84
End: 2019-11-07

## 2019-11-07 NOTE — TELEPHONE ENCOUNTER
Routing to PCP as it does not appear per chart review that a disability parking form was filled out. If this is something we can do without a new appointment, please advise and RN can obtain a form for you to complete for patient.  Evelyn Orosco RN

## 2019-11-07 NOTE — TELEPHONE ENCOUNTER
[facility] phone call message - order or referral request from patient:     Order or referral being requested: Requested order Handicapped Sticker for the car    Additional Details: Patient hasn't received the handicapped sticker for the car based on the conversation patient had with Dr. Almazan during patient's last visit. Patient requesting for one.    Referral only -Specialty None    OK to leave a message on voice mail? Yes    Primary language: English      needed? No    Call taken on November 7, 2019 at 10:02 AM by Brigette Sandoval    Order request route to [facility] TRIAGE   Referrals Route to [facility] (Green/[place]/Purple) CARE COORDINATOR

## 2019-11-08 NOTE — TELEPHONE ENCOUNTER
"Per PCP, \"We completed and sent after last visit. They can call the DMV to check on status. If needs to be redone, we can complete but will need her 's license number and she has to sign it. \"    RN called and relayed this to patient and she will check with the DMV.  Evelyn Orosco RN   "

## 2019-11-12 ENCOUNTER — TELEPHONE (OUTPATIENT)
Dept: ANESTHESIOLOGY | Facility: CLINIC | Age: 84
End: 2019-11-12

## 2019-11-12 NOTE — TELEPHONE ENCOUNTER
Spoke with: Isabell      Date Scheduled: 12/20/19    Procedure Time: 10:45    Arrival Time: 9:45     Provider: Dr. Santos     :  Yes     F/U Appointment: n/a     Patient was educated on pre-op nurse call: yes      Direct procedure:    no

## 2019-11-19 ENCOUNTER — TELEPHONE (OUTPATIENT)
Dept: FAMILY MEDICINE | Facility: CLINIC | Age: 84
End: 2019-11-19

## 2019-11-19 NOTE — TELEPHONE ENCOUNTER
I spoke with the patient and informed them to try to call the DMV to check status. She stated that it is hard to get through to them, so she is going to have one of her kids  a new form and have it sent here to Dr. Almazan to fill out. I told her I would keep an eye out for the form and let her know when we get it faxed back.    Rhoda Hodges CMA on 11/19/2019 at 1:14 PM

## 2019-11-19 NOTE — TELEPHONE ENCOUNTER
[facility] phone call message- patient requesting form status:    Type of Form: Handicap parking form.  Pt states that she was seen in September and gave form to Dr. Almazan, but still has not got her parking sticker yet and   Wants to make sure it was sent in. Please call.    Given to: Provider    Submitted: 10 business days or longer     Date Submitted: September visit     Date Needed by:ASAP    Additional Details:     Advised Patient it may take up to 7 - 10 business days: No    OK to leave a message on voice mail? Yes    Primary language: English      needed? No    Call taken on November 19, 2019 at 9:44 AM by Marilu Watson CMA    Route to P [facility] (color team) PCS

## 2019-12-20 ENCOUNTER — HOSPITAL ENCOUNTER (OUTPATIENT)
Facility: AMBULATORY SURGERY CENTER | Age: 84
End: 2019-12-20
Attending: ANESTHESIOLOGY
Payer: COMMERCIAL

## 2019-12-20 ENCOUNTER — ANCILLARY PROCEDURE (OUTPATIENT)
Dept: RADIOLOGY | Facility: AMBULATORY SURGERY CENTER | Age: 84
End: 2019-12-20
Attending: ANESTHESIOLOGY
Payer: COMMERCIAL

## 2019-12-20 VITALS
HEART RATE: 108 BPM | SYSTOLIC BLOOD PRESSURE: 152 MMHG | TEMPERATURE: 97.9 F | RESPIRATION RATE: 16 BRPM | DIASTOLIC BLOOD PRESSURE: 87 MMHG | OXYGEN SATURATION: 97 %

## 2019-12-20 DIAGNOSIS — M47.816 LUMBAR SPONDYLOSIS: ICD-10-CM

## 2019-12-20 DIAGNOSIS — R52 PAIN: ICD-10-CM

## 2019-12-20 RX ORDER — OMEGA-3 FATTY ACIDS/FISH OIL 300-1000MG
400 CAPSULE ORAL EVERY 4 HOURS PRN
COMMUNITY

## 2019-12-20 RX ORDER — IOPAMIDOL 408 MG/ML
INJECTION, SOLUTION INTRATHECAL PRN
Status: DISCONTINUED | OUTPATIENT
Start: 2019-12-20 | End: 2019-12-20 | Stop reason: HOSPADM

## 2019-12-20 RX ORDER — COVID-19 ANTIGEN TEST
440 KIT MISCELLANEOUS 2 TIMES DAILY WITH MEALS
COMMUNITY
End: 2020-06-08

## 2019-12-20 RX ORDER — BUPIVACAINE HYDROCHLORIDE 2.5 MG/ML
INJECTION, SOLUTION INFILTRATION; PERINEURAL PRN
Status: DISCONTINUED | OUTPATIENT
Start: 2019-12-20 | End: 2019-12-20 | Stop reason: HOSPADM

## 2019-12-20 NOTE — DISCHARGE INSTRUCTIONS
Home Care Instructions after a Medial Branch Block      In a medial branch block, a local anesthetic (numbing medicine) is injected near the medial branch nerve. This stops the transmission of pain signals from the facet joint. If this reduces your pain and improves your mobility, it may tell the doctor which facet joint is causing the pain. This procedure is a diagnostic procedure and is typically short lasting. With this injection, a steroid to increase the longevity of the blocks effect may or may not be used.    Activity  -You may resume most normal activity levels with the exception of strenuous activity. It is important for us to know if your pain with normal activity is relieved after this injection.  -DO NOT shower for 24 hours  -DO NOT remove bandaid for 24 hours    Pain  -You may experience soreness at the injection site for one or two days  -You may use an ice pack for 20 minutes every 2 hours for the first 24 hours  -You may use a heating pad after the first 24 hours  -You may use Tylenol (acetaminophen) every 4 hours or other pain medicines as     directed by your physician    You may experience numbness radiating into your legs or arms (depending on the procedure location). This numbness may last several hours. Until sensation returns to normal; please use caution in walking, climbing stairs, and stepping out of your vehicle, etc.    DID YOU RECEIVE SEDATION TODAY?  No    DID YOU RECEIVE STEROIDS TODAY?  No      PLEASE KEEP TRACK OF YOUR SYMPTOMS AND NOTE YOUR IMPROVEMENT FOR YOUR DOCTOR.     Please contact us if you have:  -Severe pain  -Fever more than 101.5 degrees Fahrenheit  -Signs of infection at the injection site (redness, swelling, or drainage)    If you have questions, please contact our office at [phone] between the hours of 7:00 am and 3:00 pm Monday through Friday. After office hours you can contact the on call provider by dialing [phone]. If you need immediate attention, we  recommend that you go to a hospital emergency room or dial 911.

## 2019-12-22 NOTE — PROCEDURES
Patient: Isabell Cisse Age: 84 year old   MRN: [id number] Attending: Dr. Daniel     Date of Visit: December 20, 2019    PAIN MEDICINE CLINIC PROCEDURE NOTE    ATTENDING CLINICIAN:    Jairo Daniel MD    ASSISTANT CLINICIAN:  Sweetie Carrillo DO    PREPROCEDURE DIAGNOSES:  1. Lumbar facet arthropathy   2. Chronic low back pain     POSTPROCEDURE DIAGNOSES:  1. Lumbar facet arthropathy   2. Chronic low back pain     PROCEDURE(S) PERFORMED:  1.  Bilateral Lumbar 1 and Lumbar 2 medial branch nerve nerve blocks.  Lumbar 3 medial branch nerve block was not attempted due to unfavorable anatomy.  2.  Fluoroscopic guidance for the above procedures    ANESTHESIA:  Local    MEDICATIONS ADMINISTERED  Bupivacaine 0.25% 2.4 mL (0.6mL/site)    COMPLICATIONS:  None.    INDICATIONS:    Isabell Cisse is a 84 year old female with a history of low back pain secondary to lumbar facet joint arthopathy .  The patient stated that she was in their usual state of health and denied recent anticoagulant use or recent infections.  Therefore, the plan is to perform above mentioned procedure.     Procedure Details:  Written informed consent was obtained and saved in the electronic medical record, after the risks, benefits, and alternatives were discussed with the patient.  All of the patient s questions were answered.  The patient was brought to the procedure room, where she was identified by name, medical record number and date of birth.      The patient was placed in the prone position on the procedure room table.  All pressure points were checked and comfortably padded.  Routine monitors were placed.  Vital signs were stable.  A formal time-out procedure was performed, as per protocol, including patient name, title of procedure, and site of procedure, and all in the room concurred.    A chlorhexidine prep was completed followed by sterile draping per standard procedure.    Utilizing an AP view, the fifth lumbar vertebral body and  sacral ramus were identified.   The fluoroscope was then obliqued towards the patient's left in order to identify the pedicles of the L2, and L3 vertebrae.  Due to significant post surgical changes in the patient's anatomy the L3 medial branch nerve at the L4 pedicle was not attempted.  The targets were recognized at the junction of the transverse process and the superior articular process. Skin and subcutaneous tissues overlying this area were anesthetized with a 1% lidocaine. Under intermittent fluoroscopic guidance, a 3.5 inch spinal needle was directed through the skin and subcutaneous tissues until osseous contact was achieved.  Needles WERE placed on the CONTRALATERAL side utilizing the exact technique as described above at the same levels..  The final needle position was verified in AP and lateral views.  At that point, the stylets were removed and after aspiration was negative at each site, the above listed injectate was injected equally among the placed needles.    Light pressure was held at the puncture sites to prevent ecchymosis and oozing.  The patient's skin was cleansed, and hemostasis was confirmed.  Band-aids were applied to the needle injection sites.      Condition:    The patient tolerated the procedure well and was monitored for approximately 15 minutes afterward in the post procedure area.  There were no immediate post procedure complications noted.  The patient was then discharged to home as per protocol.     Patient condition  stable.    Preprocedure pain score: 8/10  Postprocedure pain score: 2/10    Jario Daniel MD    Department of Anesthesiology  Pain Management Division

## 2019-12-22 NOTE — H&P
Abbreviated History and Physical    Patient Name: Isabell Cisse  YOB: 1935    Reason for Procedure: Lumbar spondylosis    History: Isabell Cisse is a 84 year old year old female who presents today for bilateral lumbar 1, lumbar 2, and lumbar 3 medial branch nerve blocks.  She has a history of lumbar fusion with persistent mechanical low back pain for which the aforementioned procedure will likely provide significant diagnostic value.  We discussed the procedure at length, including the risks, benefits, and alternatives, and she wishes to proceed with the procedure.  She denies any recent anticoagulant use, recent steroid exposure, recent or upcoming surgeries, or recent signs of infection or systemic illness.    Past Medical History:   Diagnosis Date     Hypertension      NO ACTIVE PROBLEMS      Other chronic pain        History of obstructive sleep apnea? NA    History of problems with sedation? NA    Physical exam:  BP (!) 152/87   Pulse 108   Temp 97.9  F (36.6  C) (Temporal)   Resp 16   SpO2 97%   General: in no apparent distress   Neuro: At least antigravity strength noted in all 4 extremities  Respiratory: Clear to ausculation bilaterally   Cardiac: Regular rate and rhythm  Skin: No rashes or lesions on exposed areas of skin    Medications:   Current Outpatient Medications   Medication     acetaminophen (TYLENOL) 325 MG tablet     amLODIPine (NORVASC) 5 MG tablet     aspirin (ASA) 81 MG tablet     ibuprofen (ADVIL/MOTRIN) 200 MG capsule     losartan (COZAAR) 50 MG tablet     LYSINE PO     MAGNESIUM PO     MULTI-VITAMIN OR TABS     naproxen sodium 220 MG capsule     VITAMIN C OR     VITAMIN D OR     Vitamin E (TOCOPHEROL) 1000 UNIT capsule     zinc gluconate 50 MG tablet     No current facility-administered medications for this encounter.        Allergies:     Allergies   Allergen Reactions     Celebrex [Celecoxib] Nausea and Vomiting     Codeine Nausea       ASA Classification:  3    OK for local anesthetic use.     Jairo Daniel MD    Department of Anesthesiology  Pain Management Division

## 2020-01-08 ENCOUNTER — TELEPHONE (OUTPATIENT)
Dept: ANESTHESIOLOGY | Facility: CLINIC | Age: 85
End: 2020-01-08

## 2020-01-08 DIAGNOSIS — M47.816 LUMBAR SPONDYLOSIS: Primary | ICD-10-CM

## 2020-01-08 NOTE — TELEPHONE ENCOUNTER
Spoke with: Isabell     Date Scheduled: 2/21/20    Procedure Time: 9    Arrival Time: 8     Provider: Dr. Santos     : Yes     F/U Appointment: n/a     Patient was educated on pre-op nurse call: Yes      Direct procedure:  No

## 2020-01-08 NOTE — TELEPHONE ENCOUNTER
Received voicemail from patient requesting to schedule injection. Voicemail was left on 12/31 as well that was forwarded to pain clinic requesting orders. Returned phone call to patient letting her know that we have gotten her messages but currently we do not have orders. Patient was informed that another message was sent to the clinic requesting orders and once I have orders I can reach out to her to assist with scheduling. Patient stated understanding.

## 2020-01-22 ENCOUNTER — DOCUMENTATION ONLY (OUTPATIENT)
Dept: ANESTHESIOLOGY | Facility: CLINIC | Age: 85
End: 2020-01-22

## 2020-01-22 NOTE — PROGRESS NOTES
Purpose of call: Follow up after Bilateral Medial branch nerve block lumbar 1 and 2   Date of service: 12/20/19  Spoke with: Pain diary     Location of pain: Low back   Percentage of pain relief after procedure: 100%  Duration of pain relief: Approximately 4 hours.    Follow up: Pt is scheduled for second dx block.     Zayra Carrera RN, BSN

## 2020-02-19 ENCOUNTER — ANESTHESIA EVENT (OUTPATIENT)
Dept: SURGERY | Facility: AMBULATORY SURGERY CENTER | Age: 85
End: 2020-02-19

## 2020-02-21 ENCOUNTER — ANCILLARY PROCEDURE (OUTPATIENT)
Dept: RADIOLOGY | Facility: AMBULATORY SURGERY CENTER | Age: 85
End: 2020-02-21
Attending: ANESTHESIOLOGY
Payer: COMMERCIAL

## 2020-02-21 ENCOUNTER — ANESTHESIA (OUTPATIENT)
Dept: SURGERY | Facility: AMBULATORY SURGERY CENTER | Age: 85
End: 2020-02-21

## 2020-02-21 ENCOUNTER — HOSPITAL ENCOUNTER (OUTPATIENT)
Facility: AMBULATORY SURGERY CENTER | Age: 85
End: 2020-02-21
Attending: ANESTHESIOLOGY
Payer: COMMERCIAL

## 2020-02-21 VITALS
BODY MASS INDEX: 21.01 KG/M2 | HEART RATE: 89 BPM | OXYGEN SATURATION: 95 % | RESPIRATION RATE: 16 BRPM | TEMPERATURE: 98.2 F | WEIGHT: 107 LBS | DIASTOLIC BLOOD PRESSURE: 81 MMHG | SYSTOLIC BLOOD PRESSURE: 142 MMHG | HEIGHT: 60 IN

## 2020-02-21 DIAGNOSIS — M47.816 LUMBAR SPONDYLOSIS: ICD-10-CM

## 2020-02-21 DIAGNOSIS — R52 PAIN: ICD-10-CM

## 2020-02-21 RX ORDER — IOPAMIDOL 408 MG/ML
INJECTION, SOLUTION INTRATHECAL PRN
Status: DISCONTINUED | OUTPATIENT
Start: 2020-02-21 | End: 2020-02-21 | Stop reason: HOSPADM

## 2020-02-21 RX ORDER — BUPIVACAINE HYDROCHLORIDE 7.5 MG/ML
INJECTION, SOLUTION EPIDURAL; RETROBULBAR PRN
Status: DISCONTINUED | OUTPATIENT
Start: 2020-02-21 | End: 2020-02-21 | Stop reason: HOSPADM

## 2020-02-21 ASSESSMENT — MIFFLIN-ST. JEOR: SCORE: 861.85

## 2020-02-21 NOTE — DISCHARGE INSTRUCTIONS
Home Care Instructions after a Medial Branch Block      In a medial branch block, a local anesthetic (numbing medicine) is injected near the medial branch nerve. This stops the transmission of pain signals from the facet joint. If this reduces your pain and improves your mobility, it may tell the doctor which facet joint is causing the pain. This procedure is a diagnostic procedure and is typically short lasting. With this injection, a steroid to increase the longevity of the blocks effect may or may not be used.    Activity  -You may resume most normal activity levels with the exception of strenuous activity. It is important for us to know if your pain with normal activity is relieved after this injection.  -DO NOT shower for 24 hours  -DO NOT remove bandaid for 24 hours    Pain  -You may experience soreness at the injection site for one or two days  -You may use an ice pack for 20 minutes every 2 hours for the first 24 hours  -You may use a heating pad after the first 24 hours  -You may use Tylenol (acetaminophen) every 4 hours or other pain medicines as     directed by your physician    You may experience numbness radiating into your legs or arms (depending on the procedure location). This numbness may last several hours. Until sensation returns to normal; please use caution in walking, climbing stairs, and stepping out of your vehicle, etc.    DID YOU RECEIVE SEDATION TODAY?  No    If you received sedation please follow these additional safety measures.  Sedation medicine, if given, may remain active for many hours. It is important for the next 24 hours that you do not:  -Drive a car  -Operate machines or power tools  -Consume alcohol, including beer  -Sign any important papers or legal documents    DID YOU RECEIVE STEROIDS TODAY?  No    Common side effects of steroids:  Not everyone will experience corticosteroid side effects. If side effects are experienced, they will gradually subside in the 7-10 day period  following an injection. Most common side effects include:  -Flushed face and/or chest  -Feeling of warmth, particularly in the face but could be an overall feeling of warmth  -Increased blood sugar in diabetic patients  -Menstrual irregularities my occur. If taking hormone-based birth control an alternate method of birth control is recommended  -Sleep disturbances and/or mood swings are possible  -Leg cramps      PLEASE KEEP TRACK OF YOUR SYMPTOMS AND NOTE YOUR IMPROVEMENT FOR YOUR DOCTOR.       Fill out the pain diary and fax it back to us. This cues us to call the patient to follow up with scheduling the next procedure. They do not need to call us they have faxed the Pain Diary.     If they do not have access to a fax machine, they can attach it to [employer] and we will follow up with them. They do not need to call us.     If they cannot fax or [employer], then call our call center and request to speak with a nurse for follow up after a procedure [phone].    Please contact us if you have:  -Severe pain  -Fever more than 101.5 degrees Fahrenheit  -Signs of infection at the injection site (redness, swelling, or drainage)    If you have questions, please contact our office at [phone] between the hours of 7:00 am and 3:00 pm Monday through Friday. After office hours you can contact the on call provider by dialing [phone]. If you need immediate attention, we recommend that you go to a hospital emergency room or dial [phone].

## 2020-02-22 DIAGNOSIS — M47.816 LUMBAR SPONDYLOSIS: Primary | ICD-10-CM

## 2020-02-22 RX ORDER — DIAZEPAM 5 MG
5 TABLET ORAL
Status: CANCELLED | OUTPATIENT
Start: 2020-02-22

## 2020-02-22 NOTE — H&P
Abbreviated History and Physical    Patient Name: Isabell Cisse  YOB: 1935    Reason for Procedure: Lumbar spondylosis    History: Isabell Cisse is a 84 year old year old female who presents today for Bilateral Lumbar 1 and lumbar 2 medial branch nerve diagnostic blocks #2.  She has a history of lumbar spondylosis for which the aforementioned procedure will likely provide significant diagnostic value.  We discussed the procedure at length, including the risks, benefits, and alternatives, and she wishes to proceed with the procedure.  She denies any recent anticoagulant use, recent steroid exposure, recent or upcoming surgeries, or recent signs of infection or systemic illness.    Past Medical History:   Diagnosis Date     Hypertension      NO ACTIVE PROBLEMS      Other chronic pain        History of obstructive sleep apnea? NA    History of problems with sedation? NA    Physical exam:  BP (!) 142/81   Pulse 89   Temp 98.2  F (36.8  C) (Temporal)   Resp 16   Ht 1.524 m (5')   Wt 48.5 kg (107 lb)   SpO2 95%   BMI 20.90 kg/m    General: in no apparent distress   Neuro: At least antigravity strength noted in all 4 extremities  Respiratory: Clear to ausculation bilaterally   Cardiac: Regular rate and rhythm  Skin: No rashes or lesions on exposed areas of skin    Medications:   Current Outpatient Medications   Medication     acetaminophen (TYLENOL) 325 MG tablet     amLODIPine (NORVASC) 5 MG tablet     aspirin (ASA) 81 MG tablet     ibuprofen (ADVIL/MOTRIN) 200 MG capsule     losartan (COZAAR) 50 MG tablet     LYSINE PO     MAGNESIUM PO     MULTI-VITAMIN OR TABS     naproxen sodium 220 MG capsule     VITAMIN C OR     VITAMIN D OR     Vitamin E (TOCOPHEROL) 1000 UNIT capsule     zinc gluconate 50 MG tablet     No current facility-administered medications for this encounter.        Allergies:     Allergies   Allergen Reactions     Celebrex [Celecoxib] Nausea and Vomiting     Codeine Nausea        ASA Classification: 2    OK for local anesthetic use.     Jairo Daniel MD    Department of Anesthesiology  Pain Management Division

## 2020-02-25 ENCOUNTER — DOCUMENTATION ONLY (OUTPATIENT)
Dept: ANESTHESIOLOGY | Facility: CLINIC | Age: 85
End: 2020-02-25

## 2020-02-25 NOTE — PROGRESS NOTES
Purpose of call: Follow up after Bilateral Lumbar 1 and lumbar 2 diagnostic medial branch nerve blocks   Date of service: 2/21/20  Spoke with: Pain diary     Location of pain: low back   Percentage of pain relief after procedure: 88%  Duration of pain relief: 2 hours     Follow up: Dr. Santos updated; orders placed for next procedure       Voice message received and return message left for pt's daughter, Concetta.  Procedure scheduling number and clinic number for her to call back with questions.     Zayra Carrera, RN, BSN

## 2020-03-02 ENCOUNTER — HOSPITAL ENCOUNTER (OUTPATIENT)
Facility: AMBULATORY SURGERY CENTER | Age: 85
End: 2020-03-02
Attending: ANESTHESIOLOGY
Payer: COMMERCIAL

## 2020-04-03 ENCOUNTER — TELEPHONE (OUTPATIENT)
Dept: ANESTHESIOLOGY | Facility: CLINIC | Age: 85
End: 2020-04-03

## 2020-04-15 ENCOUNTER — TELEPHONE (OUTPATIENT)
Dept: ANESTHESIOLOGY | Facility: CLINIC | Age: 85
End: 2020-04-15

## 2020-04-15 NOTE — TELEPHONE ENCOUNTER
LPN called and spoke to pt's daughter.   They were informed of the provider opening a clinic, and wanted to offer pt a Video visit with Dr. Santos, as their procedure has been canceled due to the current pandemic- in the mean time they could touch base with the provider for alternative recommendations- at this time.     Daughter was going to call the pt and call the clinic back if they were wanting to schedule.     Paola Lewis LPN

## 2020-05-29 DIAGNOSIS — Z11.59 ENCOUNTER FOR SCREENING FOR OTHER VIRAL DISEASES: Primary | ICD-10-CM

## 2020-06-04 ENCOUNTER — CARE COORDINATION (OUTPATIENT)
Dept: ANESTHESIOLOGY | Facility: CLINIC | Age: 85
End: 2020-06-04

## 2020-06-04 ENCOUNTER — TELEPHONE (OUTPATIENT)
Dept: ANESTHESIOLOGY | Facility: CLINIC | Age: 85
End: 2020-06-04

## 2020-06-04 NOTE — TELEPHONE ENCOUNTER
LPN called and reviewed the following with the pt.     Paola Lewis LPN        Procedure Information related to COVID-19    You will need to be tested for COVID-19 within 72 hours before your procedure.   Our Facility has a testing site located across the street from the [facility]  (Located at:  [address] [place] [address])   Please note: You will only be contacted for positive and pending results.   Please be aware that the turn around time for the test is approximately 24-48 hours, and there is a chance that results will not be back in time for you to have your procedure. Should your results still be pending the day of your procedure, you will be notified as soon as possible, as your appointment will be cancelled and you will need to be rescheduled.     Patients will need to be dropped off at the surgery entrance (on the corner of [address]   Drivers/visitors will not be allowed in the building-NO EXCEPTIONS. Patients will need to provide contact information and make/model of 's car when checking in for procedure.     Please contact the clinic if you have further questions about this information.       Information related to Scheduling and Pre-Procedure Instructions:    You Are schedule with Dr. Santos on Monday 6/8/20.   Please arrive by 0600 am, for your 0700 am Procedure.     You are OKAY to continue taking your 81 mg Aspirin.    Please call [phone] to schedule, reschedule, or cancel your procedure appointment.   Phones are answered Monday - Friday from 08:00 - 4:30pm.  Leave a voicemail with your name, birth date, and phone number if no one is available to take your call.     Your procedure: Right sided Lumbar medial branch nerve radiofrequency ablation.     On the day of the procedure  1. Arrive 1 hour earlier than your scheduled time, to the [facility]  Address: [address] [place] [id number]  2. Check in on the 5th floor for your  procedure      If you must reschedule your procedure more than two times, you must follow up in clinic before rescheduling again.    Preparing for your procedure    CAUTION - FAILURE TO FOLLOW THESE PRE-PROCEDURE INSTRUCTIONS WILL RESULT IN YOUR PROCEDURE BEING RESCHEDULED.            You must have a  take you home after your procedure. Transportation by taxi or para-transit is okay as long as you have a responsible adult accompany you. You must provide your 's full name and contact number at time of check in.     Fasting Protocol You may have NOTHING SOLID TO EAT 8 HOURS prior to arrival at the procedure area.     You may have CLEAR LIQUIDS UP TO 2 HOURS prior to arrival.    Broth and candy are considered solid food and require an eight hour fast.     Clear liquids include water, clear fruit juice (no pulp), carbonated beverages, ice, black coffee, black tea, clear jello. No alcohol containing beverages.   Medications If you take any medications, DO NOT STOP. Take your medications as usual the day of your procedure with a sip of water AT LEAST 2 HOURS PRIOR TO ARRIVAL.    Antibiotics If you are currently taking antibiotics, you must complete the entire dose 7 days prior to your scheduled procedure. You must be clear of any signs or symptoms of infection. If you begin antibiotics, please contact our clinic for instructions.     Fever, Chills, or Rash If you experience a fever of higher than 100 degrees, chills, rash, or open wounds during the one week before your procedure, please call the clinic to see if you may proceed with your procedure.      Medication Hold List  **Patients under Cardiology/Neurology care should consult their provider prior to the pain procedure to verify pre-procedure medication instructions. The information below contains general guidelines.**    Blood Thinners If you are taking daily ASPIRIN, PLAVIX, OR OTHER BLOOD THINNERS SUCH AS COUMADIN/WARFARIN, we will need your  prescribing doctor to sign a release permitting you to stop these medications. Once approved by your prescribing doctor - STOP ALL BLOOD THINNERS BASED ON THE TIME TABLE BELOW PRIOR TO YOUR PROCEDURE. If you have been instructed to stop WARFARIN(COUMADIN), you must have an INR lab drawn the day before your procedure. . Your INR must be within normal limits before we can perform your injection. MEDICATIONS CAN BE RESTARTED AFTER YOUR PROCEDURE.    14 DAY HOLD  Ticlid (ticlopidine)    10 DAY HOLD  Effient (Prasugel)    3 DAY HOLD  Xarelto (rivaroxaban) 7 DAY HOLD  Anacin, Bufferin, Ecotrin, Excedrin, Aggrenox (Aspirin)  Brilinta (ticagrelor)  Coumadin (Warfarin)  Pradexa (Dabigatran)  Elmiron (Pentosan)  Plavix (Clopidogrel Bisulfate)  Pletal (Cilostazol)    24 HOUR HOLD  Lovenox (enoxaparin)  Agrylin (Anagrelide)        Non-steroidal Anti-inflammatories (NSAIDs) DO NOT TAKE any non-steroidal anti-inflammatory medications (NSAIDs) listed on the table below. MEDICATIONS CAN BE RESTARTED AFTER YOUR PROCEDURE. Celebrex is OK to take and does not need to be discontinued.     Medications to stop:  3 DAY HOLD  Advil, Motrin (Ibuprofen)  Arthrotec (diciofenac sodium/misoprostol)  Clinoril (Sulindac)  Indocin (Indomethacin)  Lodine (Etodolac)  Toradol (Ketorolac)  Vicoprofen (Hydrocodone and Ibuprofen)  Voltaren (Diclotenac)    14 DAY HOLD  Daypro (Oxaprozin)  Feldene (Piroxicam)   7 DAY HOLD  Aleve (Naproxen sodium)  Darvon compound (contains aspirin)  Naprosyn (Naproxen)  Norgesic Forte (contains aspirin)  Mobic (Meloxicam)  Oruvall (Ketoprofen)  Percodan (contains aspirin)  Relafen (Nabumetone)  Salsalate  Trilisate  Vitamin E (more than 400 mg per day)  Any medication containing aspirin                To speak with a nurse, schedule/reschedule/cancel a clinic appointment, or request a medication refill call: [phone]     You can also reach us by [email or web address]: [email or web address]

## 2020-06-05 DIAGNOSIS — Z11.59 ENCOUNTER FOR SCREENING FOR OTHER VIRAL DISEASES: ICD-10-CM

## 2020-06-05 PROCEDURE — 99000 SPECIMEN HANDLING OFFICE-LAB: CPT | Performed by: ANESTHESIOLOGY

## 2020-06-05 PROCEDURE — 99207 ZZC NO BILLABLE SERVICE THIS VISIT: CPT

## 2020-06-05 PROCEDURE — U0003 INFECTIOUS AGENT DETECTION BY NUCLEIC ACID (DNA OR RNA); SEVERE ACUTE RESPIRATORY SYNDROME CORONAVIRUS 2 (SARS-COV-2) (CORONAVIRUS DISEASE [COVID-19]), AMPLIFIED PROBE TECHNIQUE, MAKING USE OF HIGH THROUGHPUT TECHNOLOGIES AS DESCRIBED BY CMS-2020-01-R: HCPCS | Mod: 90 | Performed by: ANESTHESIOLOGY

## 2020-06-06 LAB
SARS-COV-2 RNA SPEC QL NAA+PROBE: NOT DETECTED
SPECIMEN SOURCE: NORMAL

## 2020-06-08 ENCOUNTER — ANCILLARY PROCEDURE (OUTPATIENT)
Dept: RADIOLOGY | Facility: AMBULATORY SURGERY CENTER | Age: 85
End: 2020-06-08
Attending: ANESTHESIOLOGY
Payer: COMMERCIAL

## 2020-06-08 ENCOUNTER — HOSPITAL ENCOUNTER (OUTPATIENT)
Facility: AMBULATORY SURGERY CENTER | Age: 85
End: 2020-06-08
Attending: ANESTHESIOLOGY
Payer: COMMERCIAL

## 2020-06-08 VITALS
RESPIRATION RATE: 18 BRPM | OXYGEN SATURATION: 96 % | BODY MASS INDEX: 20.16 KG/M2 | HEART RATE: 90 BPM | DIASTOLIC BLOOD PRESSURE: 86 MMHG | TEMPERATURE: 97.7 F | WEIGHT: 100 LBS | HEIGHT: 59 IN | SYSTOLIC BLOOD PRESSURE: 135 MMHG

## 2020-06-08 DIAGNOSIS — R52 PAIN: ICD-10-CM

## 2020-06-08 DIAGNOSIS — M47.816 LUMBAR SPONDYLOSIS: ICD-10-CM

## 2020-06-08 RX ORDER — LIDOCAINE HYDROCHLORIDE 10 MG/ML
INJECTION, SOLUTION EPIDURAL; INFILTRATION; INTRACAUDAL; PERINEURAL PRN
Status: DISCONTINUED | OUTPATIENT
Start: 2020-06-08 | End: 2020-06-08 | Stop reason: HOSPADM

## 2020-06-08 RX ORDER — DIAZEPAM 5 MG
5 TABLET ORAL
Status: COMPLETED | OUTPATIENT
Start: 2020-06-08 | End: 2020-06-08

## 2020-06-08 RX ORDER — LIDOCAINE HYDROCHLORIDE 20 MG/ML
INJECTION, SOLUTION INFILTRATION; PERINEURAL PRN
Status: DISCONTINUED | OUTPATIENT
Start: 2020-06-08 | End: 2020-06-08 | Stop reason: HOSPADM

## 2020-06-08 RX ADMIN — DIAZEPAM 5 MG: 5 TABLET ORAL at 06:28

## 2020-06-08 ASSESSMENT — MIFFLIN-ST. JEOR: SCORE: 814.23

## 2020-06-08 NOTE — PROCEDURES
Patient: Isabell Cisse Age: 84 year old   MRN: [id number] Attending: Dr. Daniel     Date of Visit: June 8, 2020    [facility] PROCEDURE NOTE    ATTENDING CLINICIAN:    Jairo Daniel MD    ASSISTANT CLINICIAN:  Victoria Carlson DO    PREPROCEDURE DIAGNOSES:  1. Right Lumbar facet arthropathy   2. Chronic low back pain     POSTPROCEDURE DIAGNOSES:  1. Right Lumbar facet arthropathy   2. Chronic low back pain     PROCEDURE(S) PERFORMED:  1. Right Lumbar 1 medial branch nerve radiofrequency ablation  2. Right lumbar 2 medial branch nerve radiofrequency ablation (Attempted but not completed due to technical malfunction)  3. Right lumbar 2 medial branch nerve pulsed radiofrequency ablation  4.  Fluoroscopic guidance for the above procedures    ANESTHESIA:  Local.    COMPLICATIONS:  After needle placement, sensory testing, motor testing and administration of local anesthetic had been completed, the lumbar 2 medial branch needle was unable to achieve sufficient temperature for ablation. The lumbar 1 needle was ablated at 80 degrees for 90 seconds for two rounds of therapy and withdrawn.  The lumbar 2 needle was unable to be ablated after changing the RF probe and the RF cable.  The needle was withdrawn and a new needle was placed at the previous location.  After negative motor testing, ablation was again attempted, but this time was unable to be completed due to the probes going over temperature.  A second probe was tried with a separate cable, but again went over temperature.  We then changed treatment paradigms to pulsed radiofrequency ablation after discussing the treatment and rationale for changing treatment with the patient who agreed with proceeding.    MEDICATIONS ADMINISTERED  Lidocaine 2% 2 mL (1mL/site)    INDICATIONS:    Isabell Cisse is a 84 year old female with a history of low back pain secondary to lumbar facet joint arthropathy.  The patient experienced significant pain relief with  return of pain in an expected time frame following two diagnostic blocks. The patient stated that she was in her usual state of health and denied recent anticoagulant use or recent infections.  Therefore, the plan is to perform above mentioned procedure.     Procedure Details:    Written informed consent was obtained and saved in the electronic medical record, after the risks, benefits, and alternatives were discussed with the patient.  All of the patient s questions were answered.  The patient was brought to the procedure room, where she was identified by name, medical record number and date of birth.      The patient was placed in the prone position on the procedure room table.  All pressure points were checked and comfortably padded.  Routine monitors were placed.  Vital signs were stable.  A formal time-out procedure was performed, as per protocol, including patient name, title of procedure, and site of procedure, and all in the room concurred.    A chlorhexidine prep was completed followed by sterile draping per standard procedure.    The fluoroscope was then obliqued 15 to 20 degrees to the patient's right where the transverse processes of the lumbar 2 and lumbar 3 vertebrae were identified.  The targets for needle placement were recognized at the junction of the transverse process and the superior articular process, and an entry point was marked 2 to 3 cm inferior to this point to allow for an approach with a caudad angulation of approximately 30 degrees.    The skin was anesthetized with 1% lidocaine utilizing a 1.5 in 27g needle.  Under intermittent fluoroscopic guidance, a 100 mm RFA cannula was advanced to the junction of the transverse process and superior articular process of the aforementioned levels.  After osseous contact was made the needles were advanced until they rested at the superior margin of the junction of the transverse and superior articular processes.    Needle placement was then  confirmed in ipsilateral 45 degree fluoroscopic view which demonstrated appropriate depth without placement beyond the anterior border of the junction of the superior articular process and transverse process.  Needle placement was also confirmed in AP view demonstrating appropriate needle position at the junction of the transverse processes and superior articular processes.  Appropriate depth was further confirmed with lateral fluoroscopic view.    At that point, the stylet was removed from the RF needle and an RFA probe was then inserted. Sensory testing was conducted and 50 Hz with appropriate response less than 1 V for the lumbar 2 medial branch.  Sensory response was not noted at the lumbar 3 branch, although positioning was ideal in 3 planes of imaging.  Motor testing was then carried out at 2 Hz with no response outside of the low back and upper buttocks up to 2.0 V.  The above listed lidocaine was then injected equally through the aforementioned cannulae, and a 2 to 3 minutes was allowed for the local anesthetic to take effect.  Radio frequency ablation was then carried out at the lumbar 1 nerve site at 80  C for whole time of 90 seconds.  At no point of the patient experienced significant discomfort or symptoms or sensations outside of the low back.  The needle was then rotated 180 degrees and a second ablation was carried out using the same settings listed above also without significant discomfort, symptoms, or sensations outside of the low back.  RF cannula was then withdrawn.    The lumbar 2 needle was unable to be ablated after changing the RF probe and the RF cable.  The needle was withdrawn and a new needle was placed at the previous location.  After negative motor testing, ablation was again attempted, but this time was unable to be completed due to the probes going over temperature.  A second probe was tried with a separate cable, but again went over temperature.  We then changed treatment paradigms  to pulsed radiofrequency ablation after discussing the treatment and rationale for changing treatment with the patient who agreed with proceeding.    A contralateral procedure WAS NOT performed.    Light pressure was held at the puncture sites to prevent ecchymosis and oozing.  The patient's skin was cleansed, and hemostasis was confirmed.  Band-aids were applied to the needle injection sites.      Condition:    The patient tolerated the procedure well and was monitored for approximately 15 minutes afterward in the post procedure area.  There were no immediate post procedure complications noted.  The patient was then discharged to home as per protocol.     Patient condition  Stable.    Pre-procedure pain score: 9/10  Post-procedure pain score: 0/10    Jairo Daniel MD    Department of Anesthesiology  Pain Management Division

## 2020-06-08 NOTE — H&P
"Abbreviated History and Physical    Patient Name: Isabell Cisse  YOB: 1935    Reason for Procedure: Lumbar spondylosis    History: Isabell Cisse is a 84 year old year old female who presents today for right lumbar 1 and lumbar 2 medial branch nerve radiofrequency ablation.  She has a history of lumbar spondylosis with previous positive response x2 for diagnostic blocks of the same nerves for which the aforementioned procedure will likely provide significant therapeutic value.  We discussed the procedure at length, including the risks, benefits, and alternatives, and she wishes to proceed with the procedure.  She denies any recent anticoagulant use, recent steroid exposure, recent or upcoming surgeries, or recent signs of infection or systemic illness.    Past Medical History:   Diagnosis Date     Hypertension      NO ACTIVE PROBLEMS      Other chronic pain        History of obstructive sleep apnea? denies    History of problems with sedation? denies    Physical exam:  /71   Temp 97.2  F (36.2  C) (Temporal)   Resp 16   Ht 1.499 m (4' 11\")   Wt 45.4 kg (100 lb)   SpO2 93%   BMI 20.20 kg/m    General: in no apparent distress   Neuro: At least antigravity strength noted in all 4 extremities  Respiratory: Clear to ausculation bilaterally   Cardiac: Regular rate and rhythm  Skin: No rashes or lesions on exposed areas of skin    Medications:   Current Outpatient Medications   Medication     acetaminophen (TYLENOL) 325 MG tablet     amLODIPine (NORVASC) 5 MG tablet     aspirin (ASA) 81 MG tablet     ibuprofen (ADVIL/MOTRIN) 200 MG capsule     losartan (COZAAR) 50 MG tablet     LYSINE PO     MULTI-VITAMIN OR TABS     VITAMIN C OR     VITAMIN D OR     Vitamin E (TOCOPHEROL) 1000 UNIT capsule     MAGNESIUM PO     zinc gluconate 50 MG tablet     No current facility-administered medications for this encounter.        Allergies:     Allergies   Allergen Reactions     Celebrex [Celecoxib] Nausea " and Vomiting     Codeine Nausea       ASA Classification: 3    OK for local anesthetic use.     Jairo Daniel MD    Department of Anesthesiology  Pain Management Division

## 2020-06-08 NOTE — DISCHARGE INSTRUCTIONS
Home Care Instructions after a Radio Frequency Ablation    A radiofrequency ablation is a procedure that destroys the functionality of the nerve using radiofrequency energy. The physician uses x-ray guidance (fluoroscopy) to direct a special (radiofrequency) needle alongside the medial or lateral branch nerves. A small amount of electrical current is often carefully passed through the needle to assure it is next to the target nerve and a safe distance from other nerves. This current should briefly recreate the usual pain and cause a muscle twitch in the target area. The nerves will then be numbed to minimize pain while the lesion is being created. The radiofrequency waves are introduced to heat the tip of the needle and a heat lesion is created on the nerve to disrupt the nerve's ability to send pain signals. Please follow the aftercare instructions regarding your procedure.    Activity  -Rest today  -Do not work today  -Resume normal activity in 2-3 days  -No heavy lifting, turning or twisting for 24 hours  -DO NOT shower or soak in tub for 24 hours  -DO NOT remove bandaid for 24 hours    Pain  -You may experience soreness at the injection site for one or two days  -You may use an ice pack for 20 minutes every 2 hours for the first 24 hours, longer if needed for comfort, do not use heat  -You may use Tylenol (acetaminophen) every 4 hours or other pain medicines as directed by your physician  -If other pain medications are prescribed by your physician, please follow dosing instructions carefully.    What to expect  You may experience numbness radiating into your legs or arms (depending on the procedure location). This numbness may last several hours. Until sensation returns to normal, please use caution in walking, climbing stairs, and stepping out of your vehicle, etc. Relief of your initial symptoms can take up to 4 weeks to feel better, this is normal and due to the healing process. The procedure site may feel  like a deep burn. Using ice will greatly minimize this discomfort. Do not use numbing creams or patches over your injection sites immediately following your procedure. Please keep injection sites covered, clean and dry for 24 hours.    DID YOU RECEIVE SEDATION TODAY?  Yes (valium)    Safety  Sedation medicine, if given, may remain active for many hours. It is important for the next 24 hours that you do not:  -Drive a car  -Operate machines or power tools  -Consume alcohol, including beer  -Sign any important papers or legal documents  -Please have a responsible adult with you for 24 hours following any sedation    DID YOU RECEIVE STEROIDS TODAY?  Yes    Common side effects of steroids:  Not everyone will experience corticosteroid side effects. If side effects are experienced, they will gradually subside in the 7-10 day period following an injection. Most common side effects include:  -Flushed face and/or chest  -Feeling of warmth, particularly in the face but could be an overall feeling of warmth  -Increased blood sugar in diabetic patients  -Menstrual irregularities my occur. If taking hormone-based birth control an alternate method of birth control is recommended  -Sleep disturbances and/or mood swings are possible  -Leg cramps      Please contact us if you have:  -Severe pain  -Fever more than 101.5 degrees Fahrenheit  -Signs of infection at the injection site (redness, swelling, or drainage)    If you have questions, please contact our office at [phone] between the hours of 7:00 am and 3:00 pm Monday through Friday. After office hours you can contact the on call provider by dialing [phone]. If you need immediate attention, we recommend that you go to a hospital emergency room or dial [phone].

## 2020-09-14 DIAGNOSIS — I10 ESSENTIAL HYPERTENSION WITH GOAL BLOOD PRESSURE LESS THAN 140/90: ICD-10-CM

## 2020-09-14 NOTE — TELEPHONE ENCOUNTER
"Request for medication refill: Losartan Potassium     Providers if patient needs an appointment and you are willing to give a one month supply please refill for one month and  send a letter/MyChart using \".SMILLIMITEDREFILL\" .smillimited and route chart to \"P SMI \" (Giving one month refill in non controlled medications is strongly recommended before denial)    If refill has been denied, meaning absolutely no refills without visit, please complete the smart phrase \".smirxrefuse\" and route it to the \"P SMI MED REFILLS\"  pool to inform the patient and the pharmacy.    Marilu Watson, CMA        "

## 2020-09-15 RX ORDER — LOSARTAN POTASSIUM 50 MG/1
50 TABLET ORAL DAILY
Qty: 90 TABLET | Refills: 3 | Status: SHIPPED | OUTPATIENT
Start: 2020-09-15 | End: 2020-09-16

## 2020-09-16 ENCOUNTER — TELEPHONE (OUTPATIENT)
Dept: FAMILY MEDICINE | Facility: CLINIC | Age: 85
End: 2020-09-16

## 2020-09-16 ENCOUNTER — OFFICE VISIT (OUTPATIENT)
Dept: FAMILY MEDICINE | Facility: CLINIC | Age: 85
End: 2020-09-16
Payer: COMMERCIAL

## 2020-09-16 VITALS
BODY MASS INDEX: 19.36 KG/M2 | OXYGEN SATURATION: 96 % | WEIGHT: 98.6 LBS | TEMPERATURE: 98.6 F | HEIGHT: 60 IN | SYSTOLIC BLOOD PRESSURE: 114 MMHG | DIASTOLIC BLOOD PRESSURE: 79 MMHG | HEART RATE: 117 BPM | RESPIRATION RATE: 16 BRPM

## 2020-09-16 DIAGNOSIS — I10 ESSENTIAL HYPERTENSION WITH GOAL BLOOD PRESSURE LESS THAN 140/90: ICD-10-CM

## 2020-09-16 DIAGNOSIS — Z00.00 MEDICARE ANNUAL WELLNESS VISIT, SUBSEQUENT: Primary | ICD-10-CM

## 2020-09-16 DIAGNOSIS — R63.4 WEIGHT LOSS: ICD-10-CM

## 2020-09-16 DIAGNOSIS — R05.9 COUGH: ICD-10-CM

## 2020-09-16 DIAGNOSIS — R00.0 TACHYCARDIA: ICD-10-CM

## 2020-09-16 DIAGNOSIS — H91.93 BILATERAL HEARING LOSS, UNSPECIFIED HEARING LOSS TYPE: ICD-10-CM

## 2020-09-16 DIAGNOSIS — I71.40 ABDOMINAL AORTIC ANEURYSM (AAA) WITHOUT RUPTURE (H): ICD-10-CM

## 2020-09-16 LAB
% GRANULOCYTES: 78.6 %G (ref 40–75)
ALBUMIN SERPL-MCNC: 4.4 MG/DL (ref 3.5–4.7)
ALP SERPL-CCNC: 99.4 U/L (ref 31.7–110.5)
ALT SERPL-CCNC: 20.7 U/L (ref 0–45)
AST SERPL-CCNC: 19.7 U/L (ref 0–45)
BILIRUB SERPL-MCNC: 0.6 MG/DL (ref 0.2–1.3)
BUN SERPL-MCNC: 17.1 MG/DL (ref 7–19)
CALCIUM SERPL-MCNC: 10.2 MG/DL (ref 8.5–10.1)
CHLORIDE SERPLBLD-SCNC: 97.4 MMOL/L (ref 98–110)
CO2 SERPL-SCNC: 29.7 MMOL/L (ref 20–32)
CREAT SERPL-MCNC: 0.7 MG/DL (ref 0.5–1)
DEPRECATED CALCIDIOL+CALCIFEROL SERPL-MC: 52 UG/L (ref 20–75)
GFR SERPL CREATININE-BSD FRML MDRD: >90 ML/MIN/1.7 M2
GLUCOSE SERPL-MCNC: 114.9 MG'DL (ref 70–99)
GRANULOCYTES #: 6.8 K/UL (ref 1.6–8.3)
HCT VFR BLD AUTO: 51.5 % (ref 35–47)
HEMOGLOBIN: 15.2 G/DL (ref 11.7–15.7)
LYMPHOCYTES # BLD AUTO: 1.5 K/UL (ref 0.8–5.3)
LYMPHOCYTES NFR BLD AUTO: 17 %L (ref 20–48)
MCH RBC QN AUTO: 30.6 PG (ref 26.5–35)
MCHC RBC AUTO-ENTMCNC: 29.5 G/DL (ref 32–36)
MCV RBC AUTO: 103.7 FL (ref 78–100)
MID #: 0.4 K/UL (ref 0–2.2)
MID %: 4.4 %M (ref 0–20)
PLATELET # BLD AUTO: 269 K/UL (ref 150–450)
POTASSIUM SERPL-SCNC: 4.3 MMOL/L (ref 3.3–4.5)
PROT SERPL-MCNC: 7.3 G/DL (ref 6.8–8.8)
RBC # BLD AUTO: 4.97 M/UL (ref 3.8–5.2)
SODIUM SERPL-SCNC: 137.3 MMOL/L (ref 132.6–141.4)
TSH SERPL DL<=0.005 MIU/L-ACNC: 1.77 MU/L (ref 0.4–4)
WBC # BLD AUTO: 8.6 K/UL (ref 4–11)

## 2020-09-16 RX ORDER — ALBUTEROL SULFATE 90 UG/1
2 AEROSOL, METERED RESPIRATORY (INHALATION) EVERY 4 HOURS PRN
Qty: 1 INHALER | Refills: 3 | Status: SHIPPED | OUTPATIENT
Start: 2020-09-16

## 2020-09-16 RX ORDER — INHALER, ASSIST DEVICES
SPACER (EA) MISCELLANEOUS
Qty: 1 EACH | Refills: 0 | Status: SHIPPED | OUTPATIENT
Start: 2020-09-16

## 2020-09-16 RX ORDER — AMLODIPINE BESYLATE 5 MG/1
5 TABLET ORAL DAILY
Qty: 90 TABLET | Refills: 3 | Status: SHIPPED | OUTPATIENT
Start: 2020-09-16 | End: 2021-08-18

## 2020-09-16 RX ORDER — GUAIFENESIN 600 MG/1
1200 TABLET, EXTENDED RELEASE ORAL 2 TIMES DAILY
Qty: 30 TABLET | Refills: 1 | Status: SHIPPED | OUTPATIENT
Start: 2020-09-16

## 2020-09-16 RX ORDER — LOSARTAN POTASSIUM 50 MG/1
50 TABLET ORAL DAILY
Qty: 90 TABLET | Refills: 3 | Status: SHIPPED | OUTPATIENT
Start: 2020-09-16 | End: 2021-08-17

## 2020-09-16 RX ORDER — AMLODIPINE BESYLATE 5 MG/1
5 TABLET ORAL DAILY
Qty: 90 TABLET | Refills: 3 | Status: SHIPPED | OUTPATIENT
Start: 2020-09-16 | End: 2020-09-16

## 2020-09-16 ASSESSMENT — MIFFLIN-ST. JEOR: SCORE: 821.26

## 2020-09-16 NOTE — PROGRESS NOTES
>66 year old Wellness Visit ( Medicare etc)         HPI       This 84 year old female presents as an established patient  Marybeth Almazan who presents for a  Annual Wellness Exam.    Other issues patient wants to address today:    none      Visual Acuity: :  Testing not done declined by patient    Patient Active Problem List   Diagnosis     DDD (degenerative disc disease), lumbar     Diverticulosis of colon     Advanced directives, counseling/discussion     Abdominal aortic aneurysm (H)     Hyperlipidemia LDL goal <130     Tobacco abuse     Calculus of kidney     Seborrheic keratosis     Rotator cuff strain     Right shoulder pain     Essential hypertension with goal blood pressure less than 140/90     Osteopenia     Balance problems     History of shingles     Tubular adenoma of colon     Lumbar spondylosis       Past Medical History:   Diagnosis Date     Hypertension      NO ACTIVE PROBLEMS      Other chronic pain         Family History   Problem Relation Age of Onset     Cerebrovascular Disease Mother      Breast Cancer Mother      Hypertension Mother      Arthritis Mother      Eye Disorder Mother      Thyroid Disease Mother      Genitourinary Problems Father         kidney     Diabetes Father      Cancer Father          Problem List, Family History and past Medical History reviewed and unchanged/updated.       Health risk Assessment/ Review of Systems:       Constitutional:   Fevers or night sweats?  NO      Eyes:   Vision problems?  NO            Hearing:  Do you feel you have hearing loss?  Yes  Cardiovascular:  Chest pain, palpitations, or pain with walking?  NO        Respiratory:   Breathing problems or cough?  Yes    :   Difficulty controlling urination?  NO        Musculoskeletal:   Stiffness or sore joints?  YES           Skin:   concerning lesions or moles?  NO           Nervous System:   loss of strength or sensation,  numbness or tingling,  tremor,  dizziness,  headache?  YES        Mental Health:    depression or anxiety,  sleep problems?  YES   Cognition:  Memory problems?  YES       Weight Loss: Have you lost 10 or more pounds unintentionally in the previous year?  YES, was 107 last year  Energy: How much of the time during the past 3 weeks did you feel tired?   (check one of the following):   ?  All of the time  ? Most of the time  X Some of the time  ? A little of the time  ? None of the Time    PHQ-2 Score:   PHQ-2 ( 1999 Pfizer) 9/16/2020 10/24/2019   Q1: Little interest or pleasure in doing things 3 0   Q2: Feeling down, depressed or hopeless 3 0   PHQ-2 Score 6 0   Q1: Little interest or pleasure in doing things - Not at all   Q2: Feeling down, depressed or hopeless - Not at all   PHQ-2 Score - 0       PHQ-9 Score:   No flowsheet data found.  Medical Care:     What other specialists or organizations are involved in your medical care?  No   Patient Care Team       Relationship Specialty Notifications Start End    Marybeth Almazan MD PCP - General Family Practice  11/1/16     Phone: [phone] Fax: [phone]         [address] [place] [id number]    Marybeth Almazan MD MD Family Practice  11/2/16     referred to PT for balance    Phone: [phone] Fax: [phone]         [address] [place] [id number]    Marybeth Almazan MD Assigned PCP   6/21/20     Phone: [phone] Fax: [phone]         [address] [place] [id number]          Do you have an advanced directive? Yes      Social History / Home Safety     Social History     Tobacco Use     Smoking status: Current Every Day Smoker     Packs/day: 0.25     Years: 50.00     Pack years: 12.50     Types: Cigarettes     Smokeless tobacco: Never Used   Substance Use Topics     Alcohol use: Yes     Comment: occasionally     Marital Status:Single  Who lives in your household? Self  Does your home have any of the following safety concerns? Loose rugs in the hallway, no grab bars in the bathroom, no  handrails on the stairs or have poorly lit areas?  No   Do you feel threatened or controlled by a partner, ex-partner or anyone in your life? {Yes No   Has anyone hurt you physically, for example by pushing, hitting, slapping or kicking you   or forcing you to have sex? No       Functional Status     Do you need help with dressing yourself, bathing, or walking?No   Do you need help with the phone, transportation, shopping, preparing meals, housework, laundry, medications or managing money?No   By yourself and not using aids, do you have any difficulty walking up 10 steps  without resting? No   By yourself and not using aids, do you have any difficulty walking several hundred yards? No              Risk Behaviors and Healthy Habits     History   Smoking Status     Current Every Day Smoker     Packs/day: 0.25     Years: 50.00     Types: Cigarettes   Smokeless Tobacco     Never Used     How many servings of fruits and vegetables do you eat a day? 1 - 2 servings  Exercise:walking, Cleaning, and baking 1 day/week for an average of couple of hours   Do you frequently drive without a seatbelt? No   History   Smoking Status     Current Every Day Smoker     Packs/day: 0.25     Years: 50.00     Types: Cigarettes   Smokeless Tobacco     Never Used     Do you use any other drugs? No       Do you use alcohol?Yes  Rarely      Functional Ability   Was the patient's timed Up & Go test (Get up from chair walk, 10 feet turn, return to chair and sit down) unsteady or longer than 10 seconds? No     Fall risk:     1. Have you fallen two or more times in the past year? No   2. Have you fallen and had an injury in the past year?  No         Evaluation of Cognitive Function     Family member/caregiver input: Normal    1) Repeat 3 items (Leader, Season, Table)   2) Clock draw: ABNORMAL  Pt did not number the clock  3) 3 item recall: Recalls 1 object   Results: ABNORMAL clock, 1-2 items recalled: PROBABLE COGNITIVE IMPAIRMENT, **INFORM  "PROVIDER**    Mini-CogTM Copyright S George. Licensed by the author for use in [facility]; reprinted with permission (lazaro@.[email or web address]). All rights reserved.            Other Assessments:     CV Risk based on Pooled Cohort Risk (consider assessing every 4-6 years; consider statin in patients with 10-yr risk > 7.5%):  The ASCVD Risk score (Piersonbrittney MONTGOMERY Jr., et al., 2013) failed to calculate for the following reasons:    The 2013 ASCVD risk score is only valid for ages 40 to 79    Advance Directives: Discussed with patient and family as appropriate.  Has patient completed advance directives? Yes, advance care planning is on file.      Immunization History   Administered Date(s) Administered     FLU 6-35 months 02/07/2005     Flu, Unspecified 11/13/1998, 11/03/2000, 11/14/2001     Influenza (IIV3) PF 10/15/1999, 10/17/2003, 11/08/2005, 11/15/2007, 11/18/2008, 10/30/2011, 09/27/2012     Influenza Quad, Recombinant, p-free (RIV4) 10/24/2019     Influenza Vaccine IM > 6 months Valent IIV4 09/01/2013     Pneumo Conj 13-V (2010&after) 12/04/2015     Pneumococcal 23 valent 10/15/1999, 11/14/2001, 06/30/2006     Poliovirus, inactivated (IPV) 02/02/2012     TD (ADULT, 7+) 11/15/2007, 11/18/2008     TDAP Vaccine (Boostrix) 09/19/2019     Td (Adult), Adsorbed 07/25/1997     Twinrix A/B 02/02/2012, 03/05/2012     Typhoid IM 02/02/2012     Zoster vaccine, live 08/15/2017     Reviewed Immunization Record Today    Physical Exam     Vitals: /79   Pulse 117   Temp 98.6  F (37  C) (Oral)   Resp 16   Ht 1.52 m (4' 11.84\")   Wt 44.7 kg (98 lb 9.6 oz)   SpO2 96%   BMI 19.36 kg/m    BMI= Body mass index is 19.36 kg/m .     GENERAL APPEARANCE: thin, alert and no distress  HENT: ear canals patent and TM's normal; mouth without ulcers or lesions; and oral mucous membranes moist  Hearing loss screen:   Abnormal - B hearing loss present   DENTITION:  Good repair? Upper full dentures, lower partial. No issues  RESP: Prolonged " exp phase. Single exp wheeze in RLL, then clear  CV: regular rates and rhythm, split s2 vs gr 2/6 systolic murmur heard at apex, no peripheral edema   ABD: soft, nondistended. + ttp in RUQ with deep palpation. No HSM or masses. + BS  SKIN: no suspicious lesions or rashes  NEURO: Normal strength and tone  LYMPH: no cervical, axillary or inguinal LAD  MENTAL STATUS EXAM  Appearance: thin, appropriately groomed  Attitude: cooperative  Behavior: normal  Eye Contact: normal  Speech: normal  Orientation: oreinted to person , place, time and situation  Mood: normal  Affect: Mood Congruent  Thought Process: clear      Assessment and Plan     Welcome to Medicare Preventive Visit / Initial Medicare Annual Wellness Exam - reviewed Preventive Services and Plan form with patient as specified in Patient Instructions.    Isabell was seen today for wellness visit.    Diagnoses and all orders for this visit:    Medicare annual wellness visit, subsequent  - referred for zoster vaccine    Weight loss  - in the context of poor po intake. Adamantly denies depression, says she just has lost interest in cooking for herself. Assumes she will gain weight once she moves in with her son later this month. Agrees to basic labs today. Understands that weight loss can sometimes be an indicator of a bigger problem. Understands that these labs will not rule out underlying malignancy if one is present. Will consider additional eval depending on these results.  -     CBC with Diff Plt ([facility])  -     Comprehensive Metabolic Panel ([facility])  -     TSH with free T4 reflex  -     Vitamin D Deficiency    Tachycardia  -     CBC with Diff Plt ([facility])  -     TSH with free T4 reflex    Essential hypertension with goal blood pressure less than 140/90  -     losartan (COZAAR) 50 MG tablet; Take 1 tablet (50 mg) by mouth daily - OFFER BPGAP  -     amLODIPine (NORVASC) 5 MG tablet; Take 1 tablet (5 mg) by mouth daily    Cough, chronic, reported as mild  Probable  COPD  Nicotine dependence  -Declines spirometry. Does not want daily med. Will try prn bronchodilator and expectorant.   Not interested in complete smoking cessation - but commended for decrease in number of cigarettes she's smoking daily  -Declines lung ca screening with low dose CT  -     albuterol (PROAIR HFA/PROVENTIL HFA/VENTOLIN HFA) 108 (90 Base) MCG/ACT inhaler; Inhale 2 puffs into the lungs every 4 hours as needed for shortness of breath / dyspnea or wheezing  -     spacer (Ideal PowerHAMBER MADHAV) holding chamber; For use with albuterol inhaler  -     guaiFENesin (MUCINEX) 600 MG 12 hr tablet; Take 2 tablets (1,200 mg) by mouth 2 times daily    Abdominal aortic aneurysm (AAA) without rupture (H)  - declines further imaging    [place] - declines formal audiology. Does not want to consider hearing aids    COVID precautions reviewed.    Prefers infrequent visits. Available to her prn.      Options for treatment and follow-up care were reviewed with the Isabell De Leónak and/or guardian engaged in the decision making process and verbalized understanding of the options discussed and agreed with the final plan.    Marybeth Almazan MD

## 2020-09-16 NOTE — TELEPHONE ENCOUNTER
[facility] phone call message- medication clarification/question:    Full Medication Name: amLODIPine (NORVASC) 5 MG tablet   losartan (COZAAR) 50 MG tablet     Dose: See chart     Question/Clarification needed: Patient's pharmacy called to get clarification on the dosages for these medications.     Pharmacy confirmed as   [facility] - [place], [place] - [address]  [address]  [facility] [address]  Phone: [phone] Fax: [phone]  : Yes    Please leave ONLY preferred pharmacy    OK to leave a message on voice mail? Yes    Advised patient that RN would call back within 3 hours, unless emergent.    Primary language: English      needed? No    Call taken on September 16, 2020 at 10:00 AM by Altagracia Morales to [facility] MARIA A

## 2020-09-16 NOTE — TELEPHONE ENCOUNTER
"RN spoke to pharmacist who is wondering about the prescription comment that is on both the amlodipine and losartan order stating \"OFFER BPGAP\".     RN spoke to Dr. Almazan who does not know what the abbreviation is for- it was carried over from previous orders. Provider updating order and deleting this comment from instructions. RN contacted [facility] and let them know.     Meghan John RN    "

## 2020-09-16 NOTE — PATIENT INSTRUCTIONS
[facility] MEDICARE PERSONAL PREVENTIVE SERVICES PLAN - IMMUNIZATIONS     Here are your recommended immunizations.  Take this home for your reference.                                                    IMMUNIZATIONS Description Recommend today?     Influenza (Flu shot) Prevents flu; should get every year Yes   PCV 13 Pneumonia vaccination; you get it once No   PPSV 23 Second pneumonia vaccination; usually get it 1 year after PCV 13 No   Zoster (Shingles) Prevents shingles; you get it once Yes   Tetanus Prevents tetanus; once every 10 years No

## 2020-12-13 ENCOUNTER — HEALTH MAINTENANCE LETTER (OUTPATIENT)
Age: 85
End: 2020-12-13

## 2021-03-05 ENCOUNTER — IMMUNIZATION (OUTPATIENT)
Dept: NURSING | Facility: CLINIC | Age: 86
End: 2021-03-05
Payer: COMMERCIAL

## 2021-03-05 PROCEDURE — 0001A PR COVID VAC PFIZER DIL RECON 30 MCG/0.3 ML IM: CPT

## 2021-03-05 PROCEDURE — 91300 PR COVID VAC PFIZER DIL RECON 30 MCG/0.3 ML IM: CPT

## 2021-03-26 ENCOUNTER — IMMUNIZATION (OUTPATIENT)
Dept: NURSING | Facility: CLINIC | Age: 86
End: 2021-03-26
Attending: FAMILY MEDICINE
Payer: COMMERCIAL

## 2021-03-26 PROCEDURE — 0002A PR COVID VAC PFIZER DIL RECON 30 MCG/0.3 ML IM: CPT

## 2021-03-26 PROCEDURE — 91300 PR COVID VAC PFIZER DIL RECON 30 MCG/0.3 ML IM: CPT

## 2021-08-17 DIAGNOSIS — I10 ESSENTIAL HYPERTENSION WITH GOAL BLOOD PRESSURE LESS THAN 140/90: ICD-10-CM

## 2021-08-17 RX ORDER — LOSARTAN POTASSIUM 50 MG/1
50 TABLET ORAL DAILY
Qty: 90 TABLET | Refills: 3 | Status: SHIPPED | OUTPATIENT
Start: 2021-08-17 | End: 2021-11-12

## 2021-08-17 NOTE — TELEPHONE ENCOUNTER
"Request for medication refill:  amLODIPine (NORVASC) 5 MG tablet    Providers if patient needs an appointment and you are willing to give a one month supply please refill for one month and  send a letter/MyChart using \".SMILLIMITEDREFILL\" .smillimited and route chart to \"P [facility] \" (Giving one month refill in non controlled medications is strongly recommended before denial)    If refill has been denied, meaning absolutely no refills without visit, please complete the smart phrase \".smirxrefuse\" and route it to the \"P [facility] MED REFILLS\"  pool to inform the patient and the pharmacy.    Bonnie Fernando MA        "

## 2021-08-17 NOTE — TELEPHONE ENCOUNTER
"Request for medication refill:  losartan (COZAAR) 50 MG tablet  Providers if patient needs an appointment and you are willing to give a one month supply please refill for one month and  send a letter/MyChart using \".SMILLIMITEDREFILL\" .smillimited and route chart to \"P [facility] \" (Giving one month refill in non controlled medications is strongly recommended before denial)    If refill has been denied, meaning absolutely no refills without visit, please complete the smart phrase \".smirxrefuse\" and route it to the \"P [facility] MED REFILLS\"  pool to inform the patient and the pharmacy.    Fatou Pretty        "

## 2021-08-18 RX ORDER — AMLODIPINE BESYLATE 5 MG/1
5 TABLET ORAL DAILY
Qty: 90 TABLET | Refills: 3 | Status: SHIPPED | OUTPATIENT
Start: 2021-08-18 | End: 2021-11-12

## 2021-09-03 ENCOUNTER — TELEPHONE (OUTPATIENT)
Dept: FAMILY MEDICINE | Facility: CLINIC | Age: 86
End: 2021-09-03

## 2021-09-03 NOTE — TELEPHONE ENCOUNTER
I called the patient, she wasn't home but her daughter answered.  I informed her daughter that Dr. Kendrick does not have any sooner appointments available but the patient is welcome to call back anytime and see if there has been a cancellation.  Her daughter will relay this message to her mother.  Bia Cartagena,  [facility]

## 2021-09-03 NOTE — TELEPHONE ENCOUNTER
Reason for Call:  Other appointment    Detailed comments: Patient would like a call back if there is any appointments sooner than the one she has scheduled with Dr. Kendrick. Patient seen [facility] previously and would like to change PCP and care to [facility].    Phone Number Patient can be reached at: Home number on file [phone] (home)    Best Time: anytime    Can we leave a detailed message on this number? YES    Call taken on 9/3/2021 at 3:32 PM by Tao Goodman

## 2021-09-26 ENCOUNTER — HEALTH MAINTENANCE LETTER (OUTPATIENT)
Age: 86
End: 2021-09-26

## 2021-11-05 ENCOUNTER — DOCUMENTATION ONLY (OUTPATIENT)
Dept: LAB | Facility: CLINIC | Age: 86
End: 2021-11-05
Payer: COMMERCIAL

## 2021-11-05 DIAGNOSIS — Z00.00 ENCOUNTER FOR PREVENTIVE HEALTH EXAMINATION: Primary | ICD-10-CM

## 2021-11-05 NOTE — PROGRESS NOTES
.Isabell Cisse has an upcoming lab appointment:    Future Appointments   Date Time Provider Department Center   11/8/2021  9:15 AM [facility]   11/12/2021  2:00 PM [facility]     Patient is scheduled for the following lab 11/8/21.    There is no order available. Please review and place either future orders or HMPO (Review of Health Maintenance Protocol Orders), as appropriate.    Health Maintenance Due   Topic     ANNUAL REVIEW OF HM ORDERS      LIPID      Louisa Martinez

## 2021-11-08 ENCOUNTER — LAB (OUTPATIENT)
Dept: LAB | Facility: CLINIC | Age: 86
End: 2021-11-08
Payer: COMMERCIAL

## 2021-11-08 DIAGNOSIS — Z00.00 ROUTINE GENERAL MEDICAL EXAMINATION AT A HEALTH CARE FACILITY: Primary | ICD-10-CM

## 2021-11-12 ENCOUNTER — OFFICE VISIT (OUTPATIENT)
Dept: FAMILY MEDICINE | Facility: CLINIC | Age: 86
End: 2021-11-12
Payer: COMMERCIAL

## 2021-11-12 VITALS
SYSTOLIC BLOOD PRESSURE: 120 MMHG | TEMPERATURE: 96.1 F | WEIGHT: 96 LBS | OXYGEN SATURATION: 96 % | DIASTOLIC BLOOD PRESSURE: 78 MMHG | BODY MASS INDEX: 18.85 KG/M2 | HEIGHT: 60 IN | HEART RATE: 95 BPM

## 2021-11-12 DIAGNOSIS — I71.40 ABDOMINAL AORTIC ANEURYSM (AAA) WITHOUT RUPTURE (H): ICD-10-CM

## 2021-11-12 DIAGNOSIS — I10 ESSENTIAL HYPERTENSION WITH GOAL BLOOD PRESSURE LESS THAN 140/90: ICD-10-CM

## 2021-11-12 DIAGNOSIS — Z00.00 ENCOUNTER FOR MEDICARE ANNUAL WELLNESS EXAM: Primary | ICD-10-CM

## 2021-11-12 DIAGNOSIS — N18.1 CHRONIC KIDNEY DISEASE, STAGE 1: ICD-10-CM

## 2021-11-12 DIAGNOSIS — Z23 NEED FOR SHINGLES VACCINE: ICD-10-CM

## 2021-11-12 LAB
ANION GAP SERPL CALCULATED.3IONS-SCNC: 3 MMOL/L (ref 3–14)
BASOPHILS # BLD AUTO: 0 10E3/UL (ref 0–0.2)
BASOPHILS NFR BLD AUTO: 0 %
BUN SERPL-MCNC: 20 MG/DL (ref 7–30)
CALCIUM SERPL-MCNC: 9.8 MG/DL (ref 8.5–10.1)
CHLORIDE BLD-SCNC: 104 MMOL/L (ref 94–109)
CO2 SERPL-SCNC: 30 MMOL/L (ref 20–32)
CREAT SERPL-MCNC: 0.63 MG/DL (ref 0.52–1.04)
EOSINOPHIL # BLD AUTO: 0.1 10E3/UL (ref 0–0.7)
EOSINOPHIL NFR BLD AUTO: 2 %
ERYTHROCYTE [DISTWIDTH] IN BLOOD BY AUTOMATED COUNT: 12.5 % (ref 10–15)
GFR SERPL CREATININE-BSD FRML MDRD: 82 ML/MIN/1.73M2
GLUCOSE BLD-MCNC: 103 MG/DL (ref 70–99)
HCT VFR BLD AUTO: 47 % (ref 35–47)
HGB BLD-MCNC: 15.6 G/DL (ref 11.7–15.7)
LYMPHOCYTES # BLD AUTO: 2 10E3/UL (ref 0.8–5.3)
LYMPHOCYTES NFR BLD AUTO: 23 %
MCH RBC QN AUTO: 31.9 PG (ref 26.5–33)
MCHC RBC AUTO-ENTMCNC: 33.2 G/DL (ref 31.5–36.5)
MCV RBC AUTO: 96 FL (ref 78–100)
MONOCYTES # BLD AUTO: 0.5 10E3/UL (ref 0–1.3)
MONOCYTES NFR BLD AUTO: 6 %
NEUTROPHILS # BLD AUTO: 6.1 10E3/UL (ref 1.6–8.3)
NEUTROPHILS NFR BLD AUTO: 70 %
PLATELET # BLD AUTO: 260 10E3/UL (ref 150–450)
POTASSIUM BLD-SCNC: 4.4 MMOL/L (ref 3.4–5.3)
RBC # BLD AUTO: 4.89 10E6/UL (ref 3.8–5.2)
SODIUM SERPL-SCNC: 137 MMOL/L (ref 133–144)
WBC # BLD AUTO: 8.8 10E3/UL (ref 4–11)

## 2021-11-12 PROCEDURE — 99397 PER PM REEVAL EST PAT 65+ YR: CPT | Mod: 25 | Performed by: FAMILY MEDICINE

## 2021-11-12 PROCEDURE — 69209 REMOVE IMPACTED EAR WAX UNI: CPT | Mod: 50 | Performed by: FAMILY MEDICINE

## 2021-11-12 PROCEDURE — 90471 IMMUNIZATION ADMIN: CPT | Performed by: FAMILY MEDICINE

## 2021-11-12 PROCEDURE — 36415 COLL VENOUS BLD VENIPUNCTURE: CPT | Performed by: FAMILY MEDICINE

## 2021-11-12 PROCEDURE — 80048 BASIC METABOLIC PNL TOTAL CA: CPT | Performed by: FAMILY MEDICINE

## 2021-11-12 PROCEDURE — 90750 HZV VACC RECOMBINANT IM: CPT | Performed by: FAMILY MEDICINE

## 2021-11-12 PROCEDURE — 85025 COMPLETE CBC W/AUTO DIFF WBC: CPT | Performed by: FAMILY MEDICINE

## 2021-11-12 RX ORDER — AMLODIPINE BESYLATE 5 MG/1
5 TABLET ORAL DAILY
Qty: 90 TABLET | Refills: 3 | Status: SHIPPED | OUTPATIENT
Start: 2021-11-12 | End: 2022-11-07

## 2021-11-12 RX ORDER — LOSARTAN POTASSIUM 50 MG/1
50 TABLET ORAL DAILY
Qty: 90 TABLET | Refills: 3 | Status: SHIPPED | OUTPATIENT
Start: 2021-11-12 | End: 2022-11-07

## 2021-11-12 ASSESSMENT — ENCOUNTER SYMPTOMS
FEVER: 0
DYSURIA: 0
DIZZINESS: 1
ARTHRALGIAS: 0
BREAST MASS: 0
CHILLS: 0
JOINT SWELLING: 0
WEAKNESS: 0
HEARTBURN: 0
MYALGIAS: 0
PALPITATIONS: 0
SHORTNESS OF BREATH: 0
HEADACHES: 0
ABDOMINAL PAIN: 0
NAUSEA: 0
NERVOUS/ANXIOUS: 0
EYE PAIN: 0
PARESTHESIAS: 0
COUGH: 0
HEMATURIA: 0
DIARRHEA: 0
HEMATOCHEZIA: 0
CONSTIPATION: 0
FREQUENCY: 0
SORE THROAT: 0

## 2021-11-12 ASSESSMENT — PAIN SCALES - GENERAL: PAINLEVEL: NO PAIN (0)

## 2021-11-12 ASSESSMENT — MIFFLIN-ST. JEOR: SCORE: 797.98

## 2021-11-12 ASSESSMENT — ACTIVITIES OF DAILY LIVING (ADL): CURRENT_FUNCTION: NO ASSISTANCE NEEDED

## 2021-11-12 NOTE — PROGRESS NOTES
"SUBJECTIVE:   Isabell Cisse is a 86 year old female who presents for Preventive Visit.       Patient has been advised of split billing requirements and indicates understanding: Yes   Are you in the first 12 months of your Medicare coverage?  No    Healthy Habits:     In general, how would you rate your overall health?  Good    Frequency of exercise:  None    Do you usually eat at least 4 servings of fruit and vegetables a day, include whole grains    & fiber and avoid regularly eating high fat or \"junk\" foods?  No    Taking medications regularly:  Yes    Medication side effects:  Lightheadedness    Ability to successfully perform activities of daily living:  No assistance needed    Home Safety:  No safety concerns identified    Hearing Impairment:  Feel that people are mumbling or not speaking clearly    In the past 6 months, have you been bothered by leaking of urine?  No    In general, how would you rate your overall mental or emotional health?  Excellent      PHQ-2 Total Score: 0    Additional concerns today:  Yes    Do you feel safe in your environment? Yes    Have you ever done Advance Care Planning? (For example, a Health Directive, POLST, or a discussion with a medical provider or your loved ones about your wishes): Yes, advance care planning is on file.       Fall risk  Fallen 2 or more times in the past year?: No  Any fall with injury in the past year?: No    Cognitive Screening   1) Repeat 3 items (Leader, Season, Table)    2) Clock draw: NORMAL  3) 3 item recall: Recalls 2 objects   Results: NORMAL clock, 1-2 items recalled: COGNITIVE IMPAIRMENT LESS LIKELY    Mini-CogTM Copyright LUIZ Vazquez. Licensed by the author for use in [facility]; reprinted with permission (lazaro@.[facility]). All rights reserved.          Reviewed and updated as needed this visit by clinical staff  Tobacco  Allergies    Med Hx  Surg Hx  Fam Hx  Soc Hx       Reviewed and updated as needed this visit by Provider       "         Social History     Tobacco Use     Smoking status: Current Every Day Smoker     Packs/day: 0.25     Years: 50.00     Pack years: 12.50     Types: Cigarettes     Smokeless tobacco: Never Used   Substance Use Topics     Alcohol use: Yes     Comment: occasionally     If you drink alcohol do you typically have >3 drinks per day or >7 drinks per week? No    Alcohol Use 11/12/2021   Prescreen: >3 drinks/day or >7 drinks/week? No   Prescreen: >3 drinks/day or >7 drinks/week? -     Last visit with me 2016. She was follow  is thinking of switching to [facility] in [place] but she is not sure.    Ear wax -   Irrigated per nursing staff.    Underweight - she just doesn't have good appetite. No nausea, vomiting, diarrhea. She has occasional constipation relieved with prune juice. No depression. Sleeps well at night.  Evaluation and treatment:    Counseled.   May try protein shakes.   I gave her a target of 110#.     Wt Readings from Last 5 Encounters:   11/12/21 43.5 kg (96 lb)   09/16/20 44.7 kg (98 lb 9.6 oz)   06/08/20 45.4 kg (100 lb)   02/21/20 48.5 kg (107 lb)   10/24/19 48.5 kg (107 lb)     Body mass index is 18.91 kg/m .      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 She has a number of the scattered on her skin.   She may come back if she wants more freezing.     Right forearm skin lesion - present for about one year no change  Evaluation and treatment:    SK  Vs SCC.   I advised derm referral. She declined.     HTN - checks 132/72 136/82, 113/71, controlled in clinic.  Evaluation and treatment:   Lisinopril previously stopped due to cough.     Toprol - previously stopped due to some side effect.   Losartan 50 mg every day - no side effects.    Amlodipine 5 mg daily - no side effects.    BP Readings from Last 6 Encounters:   11/12/21 120/78   09/16/20 114/79   06/08/20 135/86   02/21/20 (!) 142/81   12/20/19 (!) 152/87   10/24/19 123/81     Last Comprehensive Metabolic Panel:  Sodium   Date Value Ref Range  Status   09/16/2020 137.3 132.6 - 141.4 mmol/L Final     Potassium   Date Value Ref Range Status   09/16/2020 4.3 3.3 - 4.5 mmol/L Final     Chloride   Date Value Ref Range Status   09/16/2020 97.4 (L) 98.0 - 110.0 mmol/L Final     Carbon Dioxide   Date Value Ref Range Status   09/16/2020 29.7 20.0 - 32.0 mmol/L Final     Anion Gap   Date Value Ref Range Status   06/07/2016 7 3 - 14 mmol/L Final     Glucose   Date Value Ref Range Status   09/16/2020 114.9 (H) 70.0 - 99.0 mg'dL Final     Urea Nitrogen   Date Value Ref Range Status   09/16/2020 17.1 7.0 - 19.0 mg/dL Final     Creatinine   Date Value Ref Range Status   09/16/2020 0.7 0.5 - 1.0 mg/dL Final     GFR Estimate   Date Value Ref Range Status   09/16/2020 >90 >60.0 mL/min/1.7 m2 Final     Calcium   Date Value Ref Range Status   09/16/2020 10.2 (H) 8.5 - 10.1 mg/dL Final     Dyslipidemia - No history of CAD, CVA, PAD or diabetes.  But has h/o AAA - see below.  Evaluation and treatment:    No need to check cholesterol since she won't take statins.    AAA - 3.4 cm on 5/1/13. Repeat on 6/5/14 3.7 cm. Repeat on 6/8/15 4.4 cm. Repeat 10/9/18 4.5 cm.   Evaluation and treatment:     we discussed risk of rupture which can be catastrophic.   She declines further follow up, citing she would not mild dying in that way.    Tobacco abuse - has to smoke outside since she lives in a senior apartment. A few cigarettes per day  Evaluation and treatment:    not ready to quit. Declines medication or counseling.     Chronic low back pain - has had 4 surgeries. Uses TENS unit.    Osteopenia - Results of DEXA done on 10/9/18.  Plan for today:    Vitamin D 2000 units daily, but not calcium due to h/o kidney stones.    Declines further DEXA.    Previous shingles - around eyes.     Surgical history  -    carpal tunnel left wrist,    neck surgery x 2,    back surgery x 4,    hysterectomy,    appendectomy,    cataract surgeries.     Preventive - Shingrix given in clinic  today.    Immunization History   Administered Date(s) Administered     COVID-19,PF,Pfizer (12+ Yrs) 03/05/2021, 03/26/2021, 10/11/2021     FLU 6-35 months 02/07/2005     Flu, Unspecified 11/13/1998, 11/03/2000, 11/14/2001     Influenza (IIV3) PF 10/15/1999, 10/17/2003, 11/08/2005, 11/15/2007, 11/18/2008, 10/30/2011, 09/27/2012     Influenza Quad, Recombinant, pf(RIV4) (Flublok) 10/24/2019     Influenza Vaccine IM > 6 months Valent IIV4 (Alfuria,Fluzone) 09/01/2013     Pneumo Conj 13-V (2010&after) 12/04/2015     Pneumococcal 23 valent 10/15/1999, 11/14/2001, 06/30/2006     Poliovirus, inactivated (IPV) 02/02/2012     TD (ADULT, 7+) 11/15/2007, 11/18/2008     TDAP Vaccine (Boostrix) 09/19/2019     Td (Adult), Adsorbed 07/25/1997     Twinrix A/B 02/02/2012, 03/05/2012     Typhoid IM 02/02/2012     Zoster vaccine recombinant adjuvanted (SHINGRIX) 11/12/2021     Zoster vaccine, live 08/15/2017     Mammogram: No more mammograms.   Colonoscopy: Colonoscopy 2/24/11. Advised to repeat in 5 years. Declines further colonoscopy.   Advanced Directive: on file     SH:    She lives with her daughter in [facility] now.  Marital status:   Kids: 2  Employment: retired  Exercise: no  Tobacco: down to 1-2 per day  Etoh: no  Recreational drugs: no  Caffeine: 6-8 per day    All Histories reviewed and updated in EPIC as appropriate.  Social History     Tobacco Use     Smoking status: Current Every Day Smoker     Packs/day: 0.25     Years: 50.00     Pack years: 12.50     Types: Cigarettes     Smokeless tobacco: Never Used   Substance Use Topics     Alcohol use: Yes     Comment: occasionally       The patient does not drink >3 drinks per day nor >7 drinks per week.    Today's PHQ-2 Score:   PHQ-2 ( 1999 Pfizer) 11/12/2021 9/16/2020   Q1: Little interest or pleasure in doing things 0 3   Q2: Feeling down, depressed or hopeless 0 3   PHQ-2 Score 0 6   Q1: Little interest or pleasure in doing things Not at all -   Q2: Feeling down, depressed  "or hopeless Not at all -   PHQ-2 Score 0 -       Current providers sharing in care for this patient include:   Patient Care Team:  Alejandro Kendrick MD as PCP - General (Family Medicine)  Marybeth Almazan MD as MD (Family Practice)  Marybeth Almazan MD as Assigned PCP    The following health maintenance items are reviewed in Epic and correct as of today:  Health Maintenance Due   Topic Date Due     ANNUAL REVIEW OF HM ORDERS  Never done     ZOSTER IMMUNIZATION (2 of 3) 10/10/2017     LIPID  09/03/2020     MEDICARE ANNUAL WELLNESS VISIT  09/16/2021     FALL RISK ASSESSMENT  09/16/2021             Pertinent mammograms are reviewed under the imaging tab.    Review of Systems   Constitutional: Negative for chills and fever.   HENT: Positive for hearing loss. Negative for congestion, ear pain and sore throat.    Eyes: Negative for pain and visual disturbance.   Respiratory: Negative for cough and shortness of breath.    Cardiovascular: Negative for chest pain, palpitations and peripheral edema.   Gastrointestinal: Negative for abdominal pain, constipation, diarrhea, heartburn, hematochezia and nausea.   Breasts:  Negative for tenderness, breast mass and discharge.   Genitourinary: Negative for dysuria, frequency, genital sores, hematuria, pelvic pain, urgency, vaginal bleeding and vaginal discharge.   Musculoskeletal: Negative for arthralgias, joint swelling and myalgias.   Skin: Negative for rash.   Neurological: Positive for dizziness. Negative for weakness, headaches and paresthesias.   Psychiatric/Behavioral: Negative for mood changes. The patient is not nervous/anxious.          OBJECTIVE:   /78   Pulse 95   Temp (!) 96.1  F (35.6  C) (Tympanic)   Ht 1.518 m (4' 11.75\")   Wt 43.5 kg (96 lb)   SpO2 96%   BMI 18.91 kg/m   Estimated body mass index is 18.91 kg/m  as calculated from the following:    Height as of this encounter: 1.518 m (4' 11.75\").    Weight as of this encounter: 43.5 kg (96 lb).  Physical " "Exam  GENERAL: healthy, alert and no distress. Here with daughter.  EYES: Eyes grossly normal to inspection, PERRL and conjunctivae and sclerae normal  HENT: ear canals and TM's normal, nose and mouth without ulcers or lesions  NECK: no adenopathy, no asymmetry, masses, or scars and thyroid normal to palpation  RESP: lungs clear to auscultation - no rales, rhonchi or wheezes  CV: regular rate and rhythm, normal S1 S2, no S3 or S4, no murmur, click or rub, no peripheral edema and peripheral pulses strong  ABDOMEN: soft, nontender, no hepatosplenomegaly, no masses and bowel sounds normal  MS: no gross musculoskeletal defects noted, no edema  SKIN: scattered SK lesion throughout the skin - there is one rough papule about 5 mm on the right forearm which seems a bit different than a typical SK.  NEURO: Normal strength and tone, mentation intact and speech normal  PSYCH: mentation appears normal, affect normal/bright    ASSESSMENT / PLAN:     COUNSELING:  Reviewed preventive health counseling, as reflected in patient instructions       Regular exercise       Healthy diet/nutrition    Estimated body mass index is 18.91 kg/m  as calculated from the following:    Height as of this encounter: 1.518 m (4' 11.75\").    Weight as of this encounter: 43.5 kg (96 lb).    Weight management plan counseled    She reports that she has been smoking cigarettes. She has a 12.50 pack-year smoking history. She has never used smokeless tobacco.  Tobacco Cessation Action Plan:   Information offered: Patient not interested at this time      Appropriate preventive services were discussed with this patient, including applicable screening as appropriate for cardiovascular disease, diabetes, osteopenia/osteoporosis, and glaucoma.  As appropriate for age/gender, discussed screening for colorectal cancer, prostate cancer, breast cancer, and cervical cancer. Checklist reviewing preventive services available has been given to the patient.    Reviewed " patients plan of care and provided an AVS. The Basic Care Plan (routine screening as documented in Health Maintenance) for Isabell meets the Care Plan requirement. This Care Plan has been established and reviewed with the Patient and daughter.    Assessment and Plan - Decision Making    1. Encounter for Medicare annual wellness exam    Results of today's exam given to patient verbally along with age appropriate preventive measures. Written instructions reviewed and handed to the patient.    - Basic metabolic panel  - CBC with Platelets & Differential    2. Essential hypertension with goal blood pressure less than 140/90    Per HPI    - amLODIPine (NORVASC) 5 MG tablet; Take 1 tablet (5 mg) by mouth daily  Dispense: 90 tablet; Refill: 3  - losartan (COZAAR) 50 MG tablet; Take 1 tablet (50 mg) by mouth daily  Dispense: 90 tablet; Refill: 3    3. Chronic kidney disease, stage 1    Per HPI      4. Abdominal aortic aneurysm (AAA) without rupture (H)    Per HPI        Written instructions given as follows:    Patient Instructions   1. Follow a healthy diet - reliable information is available at: myplate.gov.    2. Get regular exercise based on your abilities like walking (150 minutes per week).      3. Avoid the sun or otherwise use sun screen to prevent skin cancer.    4. See the dentist and eye doctor regularly.     5. I will contact you with results. If all is well, see you in one year for a physical with fasting labs.

## 2021-11-12 NOTE — NURSING NOTE
"Chief Complaint   Patient presents with     Wellness Visit       Initial BP (!) 147/73   Pulse 95   Temp (!) 96.1  F (35.6  C) (Tympanic)   Ht 1.518 m (4' 11.75\")   Wt 43.5 kg (96 lb)   SpO2 96%   BMI 18.91 kg/m   Estimated body mass index is 18.91 kg/m  as calculated from the following:    Height as of this encounter: 1.518 m (4' 11.75\").    Weight as of this encounter: 43.5 kg (96 lb).  Medication Reconciliation: complete    SANDY Edwards MA    "

## 2021-11-12 NOTE — PATIENT INSTRUCTIONS
1. Follow a healthy diet - reliable information is available at: myplate.gov.    2. Get regular exercise based on your abilities like walking (150 minutes per week).      3. Avoid the sun or otherwise use sun screen to prevent skin cancer.    4. See the dentist and eye doctor regularly.     5. I will contact you with results. If all is well, see you in one year for a physical with fasting labs.

## 2021-11-15 NOTE — NURSING NOTE
Patient identified using two patient identifiers.  Ear exam showing wax occlusion completed by Tyson GUAMAN MA.  Solution: warm water was placed in the bilateral ear(s) via irrigation tool: elephant ear.      Tyson GUAMAN MA

## 2022-11-17 ENCOUNTER — OFFICE VISIT (OUTPATIENT)
Dept: FAMILY MEDICINE | Facility: CLINIC | Age: 87
End: 2022-11-17
Payer: COMMERCIAL

## 2022-11-17 VITALS
SYSTOLIC BLOOD PRESSURE: 115 MMHG | HEART RATE: 79 BPM | TEMPERATURE: 97.5 F | RESPIRATION RATE: 12 BRPM | BODY MASS INDEX: 17.71 KG/M2 | WEIGHT: 93.8 LBS | OXYGEN SATURATION: 94 % | HEIGHT: 61 IN | DIASTOLIC BLOOD PRESSURE: 64 MMHG

## 2022-11-17 DIAGNOSIS — I10 ESSENTIAL HYPERTENSION WITH GOAL BLOOD PRESSURE LESS THAN 140/90: ICD-10-CM

## 2022-11-17 DIAGNOSIS — R26.89 IMPAIRED GAIT AND MOBILITY: ICD-10-CM

## 2022-11-17 DIAGNOSIS — Z00.00 ENCOUNTER FOR MEDICARE ANNUAL WELLNESS EXAM: Primary | ICD-10-CM

## 2022-11-17 DIAGNOSIS — E28.39 ESTROGEN DEFICIENCY: ICD-10-CM

## 2022-11-17 LAB
BASOPHILS # BLD AUTO: 0 10E3/UL (ref 0–0.2)
BASOPHILS NFR BLD AUTO: 0 %
EOSINOPHIL # BLD AUTO: 0.1 10E3/UL (ref 0–0.7)
EOSINOPHIL NFR BLD AUTO: 1 %
ERYTHROCYTE [DISTWIDTH] IN BLOOD BY AUTOMATED COUNT: 12.6 % (ref 10–15)
HCT VFR BLD AUTO: 47.1 % (ref 35–47)
HGB BLD-MCNC: 15.4 G/DL (ref 11.7–15.7)
LYMPHOCYTES # BLD AUTO: 1.6 10E3/UL (ref 0.8–5.3)
LYMPHOCYTES NFR BLD AUTO: 22 %
MCH RBC QN AUTO: 31.7 PG (ref 26.5–33)
MCHC RBC AUTO-ENTMCNC: 32.7 G/DL (ref 31.5–36.5)
MCV RBC AUTO: 97 FL (ref 78–100)
MONOCYTES # BLD AUTO: 0.5 10E3/UL (ref 0–1.3)
MONOCYTES NFR BLD AUTO: 6 %
NEUTROPHILS # BLD AUTO: 5.4 10E3/UL (ref 1.6–8.3)
NEUTROPHILS NFR BLD AUTO: 71 %
PLATELET # BLD AUTO: 247 10E3/UL (ref 150–450)
RBC # BLD AUTO: 4.86 10E6/UL (ref 3.8–5.2)
WBC # BLD AUTO: 7.6 10E3/UL (ref 4–11)

## 2022-11-17 PROCEDURE — 99213 OFFICE O/P EST LOW 20 MIN: CPT | Mod: 25 | Performed by: FAMILY MEDICINE

## 2022-11-17 PROCEDURE — 84443 ASSAY THYROID STIM HORMONE: CPT | Performed by: FAMILY MEDICINE

## 2022-11-17 PROCEDURE — G0439 PPPS, SUBSEQ VISIT: HCPCS | Performed by: FAMILY MEDICINE

## 2022-11-17 PROCEDURE — 85025 COMPLETE CBC W/AUTO DIFF WBC: CPT | Performed by: FAMILY MEDICINE

## 2022-11-17 PROCEDURE — 80048 BASIC METABOLIC PNL TOTAL CA: CPT | Performed by: FAMILY MEDICINE

## 2022-11-17 PROCEDURE — 36415 COLL VENOUS BLD VENIPUNCTURE: CPT | Performed by: FAMILY MEDICINE

## 2022-11-17 RX ORDER — LOSARTAN POTASSIUM 50 MG/1
50 TABLET ORAL DAILY
Qty: 90 TABLET | Refills: 0 | Status: SHIPPED | OUTPATIENT
Start: 2023-02-03 | End: 2023-05-03

## 2022-11-17 RX ORDER — AMLODIPINE BESYLATE 5 MG/1
5 TABLET ORAL DAILY
Qty: 90 TABLET | Refills: 3 | Status: SHIPPED | OUTPATIENT
Start: 2023-02-02 | End: 2024-02-01

## 2022-11-17 ASSESSMENT — ENCOUNTER SYMPTOMS
HEMATOCHEZIA: 0
ABDOMINAL PAIN: 0
PALPITATIONS: 1
HEADACHES: 0
PARESTHESIAS: 0
DIZZINESS: 1
DIARRHEA: 0
SHORTNESS OF BREATH: 1
HEMATURIA: 0
CONSTIPATION: 0
HEARTBURN: 0
FREQUENCY: 0
FEVER: 0
EYE PAIN: 0
DYSURIA: 0
CHILLS: 0
COUGH: 1
JOINT SWELLING: 0
WEAKNESS: 1
MYALGIAS: 0
ARTHRALGIAS: 0
SORE THROAT: 0
BREAST MASS: 0
NERVOUS/ANXIOUS: 1
NAUSEA: 0

## 2022-11-17 ASSESSMENT — ACTIVITIES OF DAILY LIVING (ADL): CURRENT_FUNCTION: SHOPPING REQUIRES ASSISTANCE

## 2022-11-17 ASSESSMENT — PAIN SCALES - GENERAL: PAINLEVEL: NO PAIN (0)

## 2022-11-17 NOTE — NURSING NOTE
Patient identified using two patient identifiers.  Ear exam showing wax occlusion completed by provider.  Solution: warm water was placed in the bilateral ear(s) via irrigation tool: elephant ear.  Patient has tolerated this well.  DEEPIKA Serrano

## 2022-11-17 NOTE — PROGRESS NOTES
"SUBJECTIVE:   Isabell is a 87 year old who presents for Preventive Visit.    Patient has been advised of split billing requirements and indicates understanding: Yes  Are you in the first 12 months of your Medicare coverage?  No    Healthy Habits:     In general, how would you rate your overall health?  Fair    Frequency of exercise:  1 day/week    Duration of exercise:  Less than 15 minutes    Do you usually eat at least 4 servings of fruit and vegetables a day, include whole grains    & fiber and avoid regularly eating high fat or \"junk\" foods?  No    Taking medications regularly:  Yes    Medication side effects:  None    Ability to successfully perform activities of daily living:  Shopping requires assistance    Home Safety:  No safety concerns identified    Hearing Impairment:  No hearing concerns    In the past 6 months, have you been bothered by leaking of urine?  No    In general, how would you rate your overall mental or emotional health?  Good      PHQ-2 Total Score: 2    Additional concerns today:  Yes      Have you ever done Advance Care Planning? (For example, a Health Directive, POLST, or a discussion with a medical provider or your loved ones about your wishes): Yes, advance care planning is on file.       Fall risk  Fallen 2 or more times in the past year?: No  Any fall with injury in the past year?: No    Cognitive Screening   1) Repeat 3 items (Leader, Season, Table)    2) Clock draw: NORMAL  3) 3 item recall: Recalls 3 objects  Results: 3 items recalled: COGNITIVE IMPAIRMENT LESS LIKELY    Mini-CogTM Copyright LUIZ Vazquez. Licensed by the author for use in [facility]; reprinted with permission (lazaro@.[email or web address]). All rights reserved.      Do you have sleep apnea, excessive snoring or daytime drowsiness?: no    Reviewed and updated as needed this visit by clinical staff                  Reviewed and updated as needed this visit by Provider                 Social History     Tobacco Use     " Smoking status: Every Day     Packs/day: 0.25     Years: 50.00     Pack years: 12.50     Types: Cigarettes     Smokeless tobacco: Never   Substance Use Topics     Alcohol use: Yes     Comment: occasionally         Alcohol Use 11/12/2021   Prescreen: >3 drinks/day or >7 drinks/week? No   Prescreen: >3 drinks/day or >7 drinks/week? -     Reviewed and updated as needed this visit by clinical staff   Tobacco  Allergies  Meds              Reviewed and updated as needed this visit by Provider                 Social History     Tobacco Use     Smoking status: Every Day     Packs/day: 0.25     Years: 50.00     Pack years: 12.50     Types: Cigarettes     Smokeless tobacco: Never   Substance Use Topics     Alcohol use: Yes     Comment: occasionally     If you drink alcohol do you typically have >3 drinks per day or >7 drinks per week? No    Alcohol Use 11/17/2022   Prescreen: >3 drinks/day or >7 drinks/week? No   Prescreen: >3 drinks/day or >7 drinks/week? -     Ear wax -   Irrigated per nursing staff.    Poor gait and balance - she is a fall risk. She has not fallen recently.  Evaluation and treatment:    rx for walker given    Underweight - she just doesn't have good appetite. No nausea, vomiting, diarrhea. She has occasional constipation relieved with prune juice. No depression. Sleeps well at night.  Evaluation and treatment:    Counseled.   She takes protein shake one can per day - I asked her to increase to 2 per day.   I gave her a target of 110#.     TSH   Date Value Ref Range Status   11/17/2022 1.47 0.40 - 4.00 mU/L Final   09/16/2020 1.77 0.40 - 4.00 mU/L Final          Wt Readings from Last 5 Encounters:   11/17/22 42.5 kg (93 lb 12.8 oz)   11/12/21 43.5 kg (96 lb)   09/16/20 44.7 kg (98 lb 9.6 oz)   06/08/20 45.4 kg (100 lb)   02/21/20 48.5 kg (107 lb)     Body mass index is 18.02 kg/m .      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 She has a number of the scattered on her skin.   She may come back if she wants more  freezing.     Right forearm skin lesion - present for about one year no change  Evaluation and treatment:    SK  Vs SCC.   I advised derm referral. She declined.     HTN - checks 132/72 136/82, 113/71, controlled in clinic.  Evaluation and treatment:   Lisinopril previously stopped due to cough.     Toprol - previously stopped due to some side effect.   Losartan 50 mg every day - no side effects.    Amlodipine 5 mg daily - no side effects.    BP Readings from Last 6 Encounters:   11/17/22 115/64   11/12/21 120/78   09/16/20 114/79   06/08/20 135/86   02/21/20 (!) 142/81   12/20/19 (!) 152/87     Last Comprehensive Metabolic Panel:  Sodium   Date Value Ref Range Status   11/17/2022 139 133 - 144 mmol/L Final   09/16/2020 137.3 132.6 - 141.4 mmol/L Final     Potassium   Date Value Ref Range Status   11/17/2022 4.2 3.4 - 5.3 mmol/L Final   09/16/2020 4.3 3.3 - 4.5 mmol/L Final     Chloride   Date Value Ref Range Status   11/17/2022 104 94 - 109 mmol/L Final   09/16/2020 97.4 (L) 98.0 - 110.0 mmol/L Final     Carbon Dioxide   Date Value Ref Range Status   09/16/2020 29.7 20.0 - 32.0 mmol/L Final     Carbon Dioxide (CO2)   Date Value Ref Range Status   11/17/2022 31 20 - 32 mmol/L Final     Anion Gap   Date Value Ref Range Status   11/17/2022 4 3 - 14 mmol/L Final   06/07/2016 7 3 - 14 mmol/L Final     Glucose   Date Value Ref Range Status   11/17/2022 109 (H) 70 - 99 mg/dL Final   09/16/2020 114.9 (H) 70.0 - 99.0 mg'dL Final     Urea Nitrogen   Date Value Ref Range Status   11/17/2022 25 7 - 30 mg/dL Final   09/16/2020 17.1 7.0 - 19.0 mg/dL Final     Creatinine   Date Value Ref Range Status   11/17/2022 0.54 0.52 - 1.04 mg/dL Final   09/16/2020 0.7 0.5 - 1.0 mg/dL Final     GFR Estimate   Date Value Ref Range Status   11/17/2022 89 >60 mL/min/1.73m2 Final     Comment:     Effective December 21, 2021 eGFRcr in adults is calculated using the 2021 CKD-EPI creatinine equation which includes age and gender (Quita et al.,  NEJM, DOI: 10.1056/UWRMjj2548020)   09/16/2020 >90 >60.0 mL/min/1.7 m2 Final     Calcium   Date Value Ref Range Status   11/17/2022 9.5 8.5 - 10.1 mg/dL Final   09/16/2020 10.2 (H) 8.5 - 10.1 mg/dL Final     CBC RESULTS: Recent Labs   Lab Test 11/17/22  1326   WBC 7.6   RBC 4.86   HGB 15.4   HCT 47.1*   MCV 97   MCH 31.7   MCHC 32.7   RDW 12.6          Dyslipidemia - No history of CAD, CVA, PAD or diabetes.  But has h/o AAA - see below.  Evaluation and treatment:    No need to check cholesterol since she won't take statins.    AAA - 3.4 cm on 5/1/13. Repeat on 6/5/14 3.7 cm. Repeat on 6/8/15 4.4 cm. Repeat 10/9/18 4.5 cm.   Evaluation and treatment:     we discussed risk of rupture which can be catastrophic.   She declines further follow up, citing she would not mild dying in that way.    Tobacco abuse - has to smoke outside since she lives in a senior apartment. A few cigarettes per day  Evaluation and treatment:    not ready to quit. Declines medication or counseling.     Chronic low back pain - has had 4 surgeries. Uses TENS unit.    Osteopenia - 10/9/18.  Plan for today:    Vitamin D 2000 units daily, but not calcium due to h/o kidney stones.    Declined repeat DEXA previously but accepted today - ordered    Previous shingles - around eyes.     Surgical history  -    carpal tunnel left wrist,    neck surgery x 2,    back surgery x 4,    hysterectomy,    appendectomy,    cataract surgeries.     Preventive - declines second Shingrix, citing she had to pay out of pocket for the 1st one.     Immunization History   Administered Date(s) Administered     COVID-19,PF,Moderna 04/11/2022     COVID-19,PF,Moderna BIVALENT Booster 09/26/2022     COVID-19,PF,Pfizer (12+ Yrs) 03/05/2021, 03/26/2021, 10/11/2021     FLU 6-35 months 02/07/2005     Flu, Unspecified 11/13/1998, 11/03/2000, 11/14/2001     Influenza (IIV3) PF 10/15/1999, 10/17/2003, 11/08/2005, 11/15/2007, 11/18/2008, 10/30/2011, 09/27/2012, 09/24/2021      Influenza Quad, Recombinant, pf(RIV4) (Flublok) 10/24/2019     Influenza Vaccine IM > 6 months Valent IIV4 (Alfuria,Fluzone) 09/01/2013, 09/25/2020     Influenza,INJ,MDCK,PF,Quad >6mo(Flucelvax) 09/26/2022     Pneumo Conj 13-V (2010&after) 12/04/2015     Pneumococcal 23 valent 10/15/1999, 11/14/2001, 06/30/2006     Poliovirus, inactivated (IPV) 02/02/2012     TD (ADULT, 7+) 11/15/2007, 11/18/2008     TDAP Vaccine (Boostrix) 09/19/2019     Td (Adult), Adsorbed 07/25/1997     Twinrix A/B 02/02/2012, 03/05/2012     Typhoid IM 02/02/2012     Zoster vaccine recombinant adjuvanted (SHINGRIX) 11/12/2021     Zoster vaccine, live 08/15/2017     Mammogram: No more mammograms.     Colonoscopy: Colonoscopy 2/24/11. Advised to repeat in 5 years. Declines further colonoscopy.     Advanced Directive: on file     SH:    She lives with her [place] in [place] now.  Marital status:   Kids: 2  Employment: retired  Exercise: no  Tobacco: down to 1-2 per day  Etoh: no  Recreational drugs: no  Caffeine: 6-8 per day    All Histories reviewed and updated in [facility] as appropriate.  Social History     Tobacco Use     Smoking status: Every Day     Packs/day: 0.25     Years: 50.00     Pack years: 12.50     Types: Cigarettes     Smokeless tobacco: Never   Substance Use Topics     Alcohol use: Yes     Comment: occasionally       The patient does not drink >3 drinks per day nor >7 drinks per week.    Today's PHQ-2 Score:   PHQ-2 ( 1999 Pfizer) 11/17/2022 11/17/2022   Q1: Little interest or pleasure in doing things 1 -   Q2: Feeling down, depressed or hopeless 1 -   PHQ-2 Score 2 -   PHQ-2 Total Score (12-17 Years)- Positive if 3 or more points; Administer PHQ-A if positive - -   Q1: Little interest or pleasure in doing things Several days Several days   Q2: Feeling down, depressed or hopeless Several days Several days   PHQ-2 Score 2 2       Current providers sharing in care for this patient include:   Patient Care Team:  Alejandro Kendrick MD as  "PCP - General (Family Medicine)  Marybeth Almazan MD as MD (Family Practice)  Alejandro Kendrick MD as Assigned PCP    The following health maintenance items are reviewed in Epic and correct as of today:  Health Maintenance Due   Topic Date Due     ANNUAL REVIEW OF HM ORDERS  Never done     COLORECTAL CANCER SCREENING  02/24/2016     LIPID  09/03/2020     MICROALBUMIN  09/03/2020     BMP  11/12/2022     ZOSTER IMMUNIZATION (3 of 3) 01/07/2022     MEDICARE ANNUAL WELLNESS VISIT  11/12/2022             Pertinent mammograms are reviewed under the imaging tab.    Review of Systems   Constitutional: Negative for chills and fever.   HENT: Positive for hearing loss. Negative for congestion, ear pain and sore throat.    Eyes: Negative for pain and visual disturbance.   Respiratory: Negative for cough and shortness of breath.    Cardiovascular: Negative for chest pain, palpitations and peripheral edema.   Gastrointestinal: Negative for abdominal pain, constipation, diarrhea, heartburn, hematochezia and nausea.   Breasts:  Negative for tenderness, breast mass and discharge.   Genitourinary: Negative for dysuria, frequency, genital sores, hematuria, pelvic pain, urgency, vaginal bleeding and vaginal discharge.   Musculoskeletal: Negative for arthralgias, joint swelling and myalgias.   Skin: Negative for rash.   Neurological: Positive for dizziness. Negative for weakness, headaches and paresthesias.   Psychiatric/Behavioral: Negative for mood changes. The patient is not nervous/anxious.          OBJECTIVE:   /64   Pulse 79   Temp 97.5  F (36.4  C) (Tympanic)   Resp 12   Ht 1.537 m (5' 0.5\")   Wt 42.5 kg (93 lb 12.8 oz)   SpO2 94%   BMI 18.02 kg/m   Estimated body mass index is 18.02 kg/m  as calculated from the following:    Height as of this encounter: 1.537 m (5' 0.5\").    Weight as of this encounter: 42.5 kg (93 lb 12.8 oz).  Physical Exam          Current providers sharing in care for this patient include: "   Patient Care Team:  Alejandro Kendrick MD as PCP - General (Family Medicine)  Marybeth Almazan MD as MD (Family Practice)  Alejandro Kendrick MD as Assigned PCP    The following health maintenance items are reviewed in Epic and correct as of today:  Health Maintenance   Topic Date Due     ANNUAL REVIEW OF HM ORDERS  Never done     COLORECTAL CANCER SCREENING  02/24/2016     LIPID  09/03/2020     MICROALBUMIN  09/03/2020     PHQ-2 (once per calendar year)  01/01/2022     BMP  11/12/2022     FALL RISK ASSESSMENT  11/12/2022     ZOSTER IMMUNIZATION (3 of 3) 01/07/2022     MEDICARE ANNUAL WELLNESS VISIT  11/12/2022     ADVANCE CARE PLANNING  11/12/2026     DTAP/TDAP/TD IMMUNIZATION (4 - Td or Tdap) 09/19/2029     INFLUENZA VACCINE  Completed     Pneumococcal Vaccine: 65+ Years  Completed     URINALYSIS  Completed     COVID-19 Vaccine  Completed     IPV IMMUNIZATION  Aged Out     MENINGITIS IMMUNIZATION  Aged Out       Pertinent mammograms are reviewed under the imaging tab.    Review of Systems   Constitutional: Negative for chills and fever.   HENT: Positive for congestion and hearing loss. Negative for ear pain and sore throat.    Eyes: Negative for pain and visual disturbance.   Respiratory: Positive for cough and shortness of breath.    Cardiovascular: Positive for palpitations. Negative for chest pain and peripheral edema.   Gastrointestinal: Negative for abdominal pain, constipation, diarrhea, heartburn, hematochezia and nausea.   Breasts:  Negative for tenderness, breast mass and discharge.   Genitourinary: Negative for dysuria, frequency, genital sores, hematuria, pelvic pain, urgency, vaginal bleeding and vaginal discharge.   Musculoskeletal: Negative for arthralgias, joint swelling and myalgias.   Skin: Negative for rash.   Neurological: Positive for dizziness and weakness. Negative for headaches and paresthesias.   Psychiatric/Behavioral: Negative for mood changes. The patient is nervous/anxious.   "      OBJECTIVE:   /64   Pulse 79   Temp 97.5  F (36.4  C) (Tympanic)   Resp 12   Ht 1.537 m (5' 0.5\")   Wt 42.5 kg (93 lb 12.8 oz)   SpO2 94%   BMI 18.02 kg/m   Estimated body mass index is 18.91 kg/m  as calculated from the following:    Height as of 11/12/21: 1.518 m (4' 11.75\").    Weight as of 11/12/21: 43.5 kg (96 lb).  Physical Exam    GENERAL: healthy, alert and no distress. Here with daughter.  EYES: Eyes grossly normal to inspection, PERRL and conjunctivae and sclerae normal  HENT: ear canals and TM's normal, nose and mouth without ulcers or lesions  NECK: no adenopathy, no asymmetry, masses, or scars and thyroid normal to palpation  RESP: lungs clear to auscultation - no rales, rhonchi or wheezes  CV: regular rate and rhythm, normal S1 S2, no S3 or S4, no murmur, click or rub, no peripheral edema and peripheral pulses strong  ABDOMEN: soft, nontender, no hepatosplenomegaly, no masses and bowel sounds normal  MS: no gross musculoskeletal defects noted, no edema  SKIN: scattered SK lesion throughout the skin - there is one rough papule about 5 mm on the right forearm which seems a bit different than a typical SK.  NEURO: balance is unsteady.  PSYCH: mentation appears normal, affect normal/bright    ASSESSMENT / PLAN:             COUNSELING:  Reviewed preventive health counseling, as reflected in patient instructions       Regular exercise       Healthy diet/nutrition        She reports that she has been smoking cigarettes. She has a 12.50 pack-year smoking history. She has never used smokeless tobacco.  Nicotine/Tobacco Cessation Plan:   Information offered: Patient not interested at this time      Appropriate preventive services were discussed with this patient, including applicable screening as appropriate for cardiovascular disease, diabetes, osteopenia/osteoporosis, and glaucoma.  As appropriate for age/gender, discussed screening for colorectal cancer, prostate cancer, breast cancer, and " cervical cancer. Checklist reviewing preventive services available has been given to the patient.    Reviewed patients plan of care and provided an AVS. The Basic Care Plan (routine screening as documented in Health Maintenance) for Isabell meets the Care Plan requirement. This Care Plan has been established and reviewed with the Patient and daughter.      Assessment and Plan - Decision Making    1. Encounter for Medicare annual wellness exam    Results of today's exam given to patient verbally along with age appropriate preventive measures. Written instructions reviewed and handed to the patient.    - BASIC METABOLIC PANEL  - CBC with platelets and differential  - TSH with free T4 reflex    2. Essential hypertension with goal blood pressure less than 140/90    Per HPI    - amLODIPine (NORVASC) 5 MG tablet; Take 1 tablet (5 mg) by mouth daily  Dispense: 90 tablet; Refill: 3  - losartan (COZAAR) 50 MG tablet; Take 1 tablet (50 mg) by mouth daily  Dispense: 90 tablet; Refill: 0    3. Estrogen deficiency    Per HPI    - DX Hip/Pelvis/Spine; Future    4. Impaired gait and mobility    Per HPI    - Walker Order for DME - ONLY FOR DME      Written instructions given as follows:    Patient Instructions   1. Try to get over 100# - protein shake twice per day.    2. Get regular exercise based on your abilities like walking.    3. See the dentist and eye doctor regularly.     4. Get your second Shingles vaccine at your pharmacy.    5. I wrote rx for walker for you.    6. Set up the bone density test - [phone].    7. I will contact you with results. If all is well, see you in one year for a physical with fasting labs.

## 2022-11-17 NOTE — PATIENT INSTRUCTIONS
1. Try to get over 100# - protein shake twice per day.    2. Get regular exercise based on your abilities like walking.    3. See the dentist and eye doctor regularly.     4. Get your second Shingles vaccine at your pharmacy.    5. I wrote rx for walker for you.    6. Set up the bone density test - [phone].    7. I will contact you with results. If all is well, see you in one year for a physical with fasting labs.

## 2022-11-18 LAB
ANION GAP SERPL CALCULATED.3IONS-SCNC: 4 MMOL/L (ref 3–14)
BUN SERPL-MCNC: 25 MG/DL (ref 7–30)
CALCIUM SERPL-MCNC: 9.5 MG/DL (ref 8.5–10.1)
CHLORIDE BLD-SCNC: 104 MMOL/L (ref 94–109)
CO2 SERPL-SCNC: 31 MMOL/L (ref 20–32)
CREAT SERPL-MCNC: 0.54 MG/DL (ref 0.52–1.04)
GFR SERPL CREATININE-BSD FRML MDRD: 89 ML/MIN/1.73M2
GLUCOSE BLD-MCNC: 109 MG/DL (ref 70–99)
POTASSIUM BLD-SCNC: 4.2 MMOL/L (ref 3.4–5.3)
SODIUM SERPL-SCNC: 139 MMOL/L (ref 133–144)
TSH SERPL DL<=0.005 MIU/L-ACNC: 1.47 MU/L (ref 0.4–4)

## 2022-11-18 NOTE — RESULT ENCOUNTER NOTE
Hiral Hylton,    Mom's glucose was a bit elevated but this is not worrisome. Try to get her away from sweets and on to more protein type foods.    All other labs were fine.    Please monitor her weight and let me know if it keeps slipping.    Regards,    Alejandro Kendrick M.D.

## 2023-01-17 ENCOUNTER — MYC MEDICAL ADVICE (OUTPATIENT)
Dept: FAMILY MEDICINE | Facility: CLINIC | Age: 88
End: 2023-01-17

## 2023-01-17 NOTE — TELEPHONE ENCOUNTER
To provider to advise, do you want to see patient in person, or do video visit?      Deepika DAMON, RN

## 2023-01-29 ENCOUNTER — MYC MEDICAL ADVICE (OUTPATIENT)
Dept: FAMILY MEDICINE | Facility: CLINIC | Age: 88
End: 2023-01-29
Payer: COMMERCIAL

## 2023-01-30 ENCOUNTER — NURSE TRIAGE (OUTPATIENT)
Dept: FAMILY MEDICINE | Facility: CLINIC | Age: 88
End: 2023-01-30
Payer: COMMERCIAL

## 2023-01-30 NOTE — TELEPHONE ENCOUNTER
Pt's daughter, Concetta, returned call to clinic regarding message below. See Consent to Communicate form giving authorization to discuss PHI with Concetta Fabian, scanned on 9/3/19.    See 1/30/23 triage encounter documentation. Advised pt needs to go to ED now, or call 911 if unable to get to vehicle for daughter to transport.

## 2023-01-30 NOTE — TELEPHONE ENCOUNTER
Pt's daughter, Concetta, returned call to clinic regarding message below. See Consent to Communicate form giving authorization to discuss PHI with Concetta Fabian, scanned on 9/3/19.    See 1/30/23 triage encounter documentation. Advised pt needs to go to ED now, or call 911 if unable to get to vehicle for daughter to transport.    Paola Beltran, CRESENCION, RN

## 2023-01-30 NOTE — TELEPHONE ENCOUNTER
"  See 1/29/23 Golgi message below:  \"HI Dr. Kendrick -  My mom is ready to come and see you.    Two weeks ago she had vaginal bleeding, nausea and a dizzy spell.  She recovered to some degree but has had continued weakness,  and now dizziness, and blurred vision.  She'll come in and see you, hoping for Monday, we can be there anytime.       Thanks.  Concetta (daughter)\"    Pt's daughter, Concetta, returned call to clinic regarding message below. See Consent to Communicate form giving authorization to discuss PHI with Concetta Fabian, scanned on 9/3/19.    Concetta confirmed pt experiencing the sx reported in Golgi message shown above. She states pt's vaginal bleeding has stopped, but when present was filling pads and not just spotting volumes. She states pt's dizziness started when the vaginal bleeding started. She states the pt has now been having blurred vision x 2 days. She states the pt has refused to go to urgent care or ED.      See 1/30/23 triage encounter documentation. RN advised pt needs to go to ED now, or call 911 if unable to get to vehicle for daughter to transport as sx could be life-threatening. RN offered to speak with pt now. She states she will communicate the plan to pt and agrees to call 911 if pt is unwilling or unable to go with her to ED now. Concetta indicates understanding of issues and agrees with the plan.      Reason for Disposition    Loss of vision or double vision  (Exception: Similar to previous migraines.)    Follows bleeding (e.g., stomach, rectum, vagina)  (Exception: Became dizzy from sight of small amount blood.)    Patient sounds very sick or weak to the triager    Additional Information    Negative: SEVERE difficulty breathing (e.g., struggling for each breath, speaks in single words)    Negative: Shock suspected (e.g., cold/pale/clammy skin, too weak to stand, low BP, rapid pulse)    Negative: Difficult to awaken or acting confused (e.g., disoriented, slurred " speech)    Negative: Fainted, and still feels dizzy afterwards    Negative: Overdose (accidental or intentional) of medications    Negative: New neurologic deficit that is present now: * Weakness of the face, arm, or leg on one side of the body * Numbness of the face, arm, or leg on one side of the body * Loss of speech or garbled speech    Negative: Heart beating < 50 beats per minute OR > 140 beats per minute    Negative: Sounds like a life-threatening emergency to the triager    Negative: Chest pain    Negative: Rectal bleeding, bloody stool, or tarry-black stool    Negative: Vomiting is main symptom    Negative: Diarrhea is main symptom    Negative: Headache is main symptom    Negative: Heat exhaustion suspected (i.e., dehydration from heat exposure)    Negative: Patient states that they are having an anxiety or panic attack    Negative: Dizziness from low blood sugar (i.e., < 60 mg/dl or 3.5 mmol/l)    Protocols used: DIZZINESS-A-OH    NELSON Dudley, RN

## 2023-02-03 ENCOUNTER — OFFICE VISIT (OUTPATIENT)
Dept: FAMILY MEDICINE | Facility: CLINIC | Age: 88
End: 2023-02-03
Payer: COMMERCIAL

## 2023-02-03 VITALS
TEMPERATURE: 96.4 F | WEIGHT: 94 LBS | SYSTOLIC BLOOD PRESSURE: 115 MMHG | HEIGHT: 60 IN | HEART RATE: 94 BPM | OXYGEN SATURATION: 94 % | DIASTOLIC BLOOD PRESSURE: 75 MMHG | BODY MASS INDEX: 18.46 KG/M2 | RESPIRATION RATE: 20 BRPM

## 2023-02-03 DIAGNOSIS — R26.89 BALANCE PROBLEMS: Primary | ICD-10-CM

## 2023-02-03 DIAGNOSIS — R63.6 UNDERWEIGHT: ICD-10-CM

## 2023-02-03 PROCEDURE — 99214 OFFICE O/P EST MOD 30 MIN: CPT | Performed by: FAMILY MEDICINE

## 2023-02-03 RX ORDER — MIRTAZAPINE 15 MG/1
15 TABLET, FILM COATED ORAL AT BEDTIME
Qty: 90 TABLET | Refills: 1 | Status: SHIPPED | OUTPATIENT
Start: 2023-02-03

## 2023-02-03 ASSESSMENT — PATIENT HEALTH QUESTIONNAIRE - PHQ9
SUM OF ALL RESPONSES TO PHQ QUESTIONS 1-9: 6
10. IF YOU CHECKED OFF ANY PROBLEMS, HOW DIFFICULT HAVE THESE PROBLEMS MADE IT FOR YOU TO DO YOUR WORK, TAKE CARE OF THINGS AT HOME, OR GET ALONG WITH OTHER PEOPLE: SOMEWHAT DIFFICULT
SUM OF ALL RESPONSES TO PHQ QUESTIONS 1-9: 6

## 2023-02-03 NOTE — PATIENT INSTRUCTIONS
You will get a call from [facility] Care. Please work with them on scheduling etc.    2.   Mirtazapine 15 mg at bedtime. May cause drowsiness. We have the option of increasing this dose if it does not give you side effects.    3.   See you in one month for follow up: weight gain of 4 pounds.

## 2023-02-03 NOTE — PROGRESS NOTES
"Hospital follow up 01/30/23 HAI Whyte is an 87 year old female here for Hospital follow up:    Isabell was living with her daughter in [facility] but now living by herself in [place]? She gets lots of help from her 2 daughters.    Poor gait and balance, vertigo, vaginal bleeding - she is a fall risk. She has not fallen recently. Her balance problems are not easy for her to describe. Not near syncope and not clearly vertigo but \"just off balance.\" This is random and not positional or exertional. She has no uterus but noticed bleeding \"like a period.\" This occurred only once and not since.  Evaluation and treatment:    ER visit 1/30/23 - nothing concerning was found (including pelvic exam).   Uses walker    We discussed differential including cerebellar dysfunction (CVA, posterior circulation stenosis), inner ear issue (PPV), orthostatic hypotension and generally cardiac (unlikely).   At this point I did not see a need for further evaluation like MRA, neurology referral etc since she would not like any invasive treatments.   But she agreed to a Home Care evaluation including RN/PT/OT/SW.    Underweight - she just doesn't have good appetite. No nausea, vomiting, diarrhea. She has occasional constipation relieved with prune juice. No depression. Sleeps well at night.  Evaluation and treatment:    Counseled.   She takes protein shake one can per day - I asked her to increase to 2 per day.   I gave her a target of 110#.    We will try Remeron 15 mg at bedtime - side effects discussed.    TSH   Date Value Ref Range Status   11/17/2022 1.47 0.40 - 4.00 mU/L Final   09/16/2020 1.77 0.40 - 4.00 mU/L Final          Wt Readings from Last 5 Encounters:   02/03/23 42.6 kg (94 lb)   11/17/22 42.5 kg (93 lb 12.8 oz)   11/12/21 43.5 kg (96 lb)   09/16/20 44.7 kg (98 lb 9.6 oz)   06/08/20 45.4 kg (100 lb)     Body mass index is 18.36 kg/m .      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 She has a number of the scattered on " her skin.   She may come back if she wants more freezing.     Right forearm skin lesion - present for about one year no change  Evaluation and treatment:    SK  Vs SCC.   I advised derm referral. She declined.     HTN - checks 132/72 136/82, 113/71, controlled in clinic.  Evaluation and treatment:   Lisinopril previously stopped due to cough.     Toprol - previously stopped due to some side effect.   Losartan 50 mg every day - no side effects.    Amlodipine 5 mg daily - no side effects.    BP Readings from Last 6 Encounters:   02/03/23 115/75   11/17/22 115/64   11/12/21 120/78   09/16/20 114/79   06/08/20 135/86   02/21/20 (!) 142/81     Last Comprehensive Metabolic Panel:  Sodium   Date Value Ref Range Status   11/17/2022 139 133 - 144 mmol/L Final   09/16/2020 137.3 132.6 - 141.4 mmol/L Final     Potassium   Date Value Ref Range Status   11/17/2022 4.2 3.4 - 5.3 mmol/L Final   09/16/2020 4.3 3.3 - 4.5 mmol/L Final     Chloride   Date Value Ref Range Status   11/17/2022 104 94 - 109 mmol/L Final   09/16/2020 97.4 (L) 98.0 - 110.0 mmol/L Final     Carbon Dioxide   Date Value Ref Range Status   09/16/2020 29.7 20.0 - 32.0 mmol/L Final     Carbon Dioxide (CO2)   Date Value Ref Range Status   11/17/2022 31 20 - 32 mmol/L Final     Anion Gap   Date Value Ref Range Status   11/17/2022 4 3 - 14 mmol/L Final   06/07/2016 7 3 - 14 mmol/L Final     Glucose   Date Value Ref Range Status   11/17/2022 109 (H) 70 - 99 mg/dL Final   09/16/2020 114.9 (H) 70.0 - 99.0 mg'dL Final     Urea Nitrogen   Date Value Ref Range Status   11/17/2022 25 7 - 30 mg/dL Final   09/16/2020 17.1 7.0 - 19.0 mg/dL Final     Creatinine   Date Value Ref Range Status   11/17/2022 0.54 0.52 - 1.04 mg/dL Final   09/16/2020 0.7 0.5 - 1.0 mg/dL Final     GFR Estimate   Date Value Ref Range Status   11/17/2022 89 >60 mL/min/1.73m2 Final     Comment:     Effective December 21, 2021 eGFRcr in adults is calculated using the 2021 CKD-EPI creatinine equation  which includes age and gender (Quita alicea al., NE, DOI: 10.1056/EBLWld8504274)   09/16/2020 >90 >60.0 mL/min/1.7 m2 Final     Calcium   Date Value Ref Range Status   11/17/2022 9.5 8.5 - 10.1 mg/dL Final   09/16/2020 10.2 (H) 8.5 - 10.1 mg/dL Final     CBC RESULTS: Recent Labs   Lab Test 11/17/22  1326   WBC 7.6   RBC 4.86   HGB 15.4   HCT 47.1*   MCV 97   MCH 31.7   MCHC 32.7   RDW 12.6          Dyslipidemia - No history of CAD, CVA, PAD or diabetes.  But has h/o AAA - see below.  Evaluation and treatment:    No need to check cholesterol since she won't take statins.    AAA - 3.4 cm on 5/1/13. Repeat on 6/5/14 3.7 cm. Repeat on 6/8/15 4.4 cm. Repeat 10/9/18 4.5 cm.   Evaluation and treatment:     we discussed risk of rupture which can be catastrophic.   She declines further follow up, citing she would not mild dying in that way. Reiterated today (2/3/23)    Tobacco abuse - has to smoke outside since she lives in a senior apartment. A few cigarettes per day  Evaluation and treatment:    not ready to quit. Declines medication or counseling.     Chronic low back pain - has had 4 surgeries. Uses TENS unit.    Osteopenia - 10/9/18.  Plan for today:    Vitamin D 2000 units daily, but not calcium due to h/o kidney stones.    Declined repeat DEXA previously but accepted today - ordered    Previous shingles - around eyes.     Surgical history  -    carpal tunnel left wrist,    neck surgery x 2,    back surgery x 4,    hysterectomy,    appendectomy,    cataract surgeries.     Preventive - declines second Shingrix, citing she had to pay out of pocket for the 1st one.     Immunization History   Administered Date(s) Administered     COVID-19 Vaccine 12+ (Pfizer) 03/05/2021, 03/26/2021, 10/11/2021     COVID-19 Vaccine 18+ (Moderna) 04/11/2022     COVID-19 Vaccine Bivalent Booster 18+ (Moderna) 09/26/2022     FLU 6-35 months 02/07/2005     Flu, Unspecified 11/13/1998, 11/03/2000, 11/14/2001     Influenza (IIV3) PF  10/15/1999, 10/17/2003, 11/08/2005, 11/15/2007, 11/18/2008, 10/30/2011, 09/27/2012, 09/24/2021     Influenza Vaccine 50-64 or 18-64 w/egg allergy (Flublok) 10/24/2019     Influenza Vaccine >6 months (Alfuria,Fluzone) 09/01/2013, 09/25/2020     Influenza,INJ,MDCK,PF,Quad >6mo(Flucelvax) 09/26/2022     Pneumo Conj 13-V (2010&after) 12/04/2015     Pneumococcal 23 valent 10/15/1999, 11/14/2001, 06/30/2006     Poliovirus, inactivated (IPV) 02/02/2012     TD (ADULT, 7+) 11/15/2007, 11/18/2008     TDAP Vaccine (Boostrix) 09/19/2019     Td (Adult), Adsorbed 07/25/1997     Twinrix A/B 02/02/2012, 03/05/2012     Typhoid IM 02/02/2012     Zoster vaccine recombinant adjuvanted (SHINGRIX) 11/12/2021     Zoster vaccine, live 08/15/2017     Mammogram: No more mammograms.     Colonoscopy: Colonoscopy 2/24/11. Advised to repeat in 5 years. Declines further colonoscopy.     Advanced Directive: on file     SH:    She lives with her daughter in [facility] now.  Marital status:   Kids: 2  Employment: retired  Exercise: no  Tobacco: down to 1-2 per day  Etoh: no  Recreational drugs: no  Caffeine: 6-8 per day        OBJECTIVE:   /75   Pulse 94   Temp (!) 96.4  F (35.8  C) (Tympanic)   Resp 20   Ht 1.524 m (5')   Wt 42.6 kg (94 lb)   SpO2 94%   BMI 18.36 kg/m   Estimated body mass index is 18.36 kg/m  as calculated from the following:    Height as of this encounter: 1.524 m (5').    Weight as of this encounter: 42.6 kg (94 lb).  Physical Exam    GENERAL: healthy, alert and no distress. Here with daughter.  EYES: Eyes grossly normal to inspection, PERRL and conjunctivae and sclerae normal  HENT: ear canals and TM's normal, nose and mouth without ulcers or lesions  NECK: no adenopathy, no asymmetry, masses, or scars and thyroid normal to palpation  RESP: lungs clear to auscultation - no rales, rhonchi or wheezes  CV: regular rate and rhythm, normal S1 S2, no S3 or S4, no murmur, click or rub, no peripheral edema and peripheral  pulses strong  ABDOMEN: soft, nontender, no hepatosplenomegaly, no masses and bowel sounds normal  MS: no gross musculoskeletal defects noted, no edema  NEURO: balance is unsteady. Memory is good on cursory exam.  PSYCH: mentation appears normal, affect normal/bright     Assessment and Plan - Decision Making    1. Balance problems    Per HPI    - Home Care Referral    2. Underweight    Per HPI    - mirtazapine (REMERON) 15 MG tablet; Take 1 tablet (15 mg) by mouth At Bedtime  Dispense: 90 tablet; Refill: 1  - Home Care Referral      Written instructions given as follows:    Patient Instructions   1. You will get a call from Home Care. Please work with them on scheduling etc.    2.   Mirtazapine 15 mg at bedtime. May cause drowsiness. We have the option of increasing this dose if it does not give you side effects.    3.   See you in one month for follow up: weight gain of 4 pounds.

## 2023-02-03 NOTE — NURSING NOTE
Chief Complaint   Patient presents with     Hospital F/U       Initial /75   Pulse 94   Temp (!) 96.4  F (35.8  C) (Tympanic)   Resp 20   Ht 1.524 m (5')   Wt 42.6 kg (94 lb)   SpO2 94%   BMI 18.36 kg/m   Estimated body mass index is 18.36 kg/m  as calculated from the following:    Height as of this encounter: 1.524 m (5').    Weight as of this encounter: 42.6 kg (94 lb).  Medication Reconciliation: britney Edwards MA

## 2023-03-17 ENCOUNTER — OFFICE VISIT (OUTPATIENT)
Dept: FAMILY MEDICINE | Facility: CLINIC | Age: 88
End: 2023-03-17
Payer: COMMERCIAL

## 2023-03-17 VITALS
HEART RATE: 121 BPM | DIASTOLIC BLOOD PRESSURE: 64 MMHG | HEIGHT: 59 IN | SYSTOLIC BLOOD PRESSURE: 105 MMHG | BODY MASS INDEX: 19.48 KG/M2 | OXYGEN SATURATION: 94 % | RESPIRATION RATE: 16 BRPM | TEMPERATURE: 97.6 F | WEIGHT: 96.6 LBS

## 2023-03-17 DIAGNOSIS — R63.6 UNDERWEIGHT: Primary | ICD-10-CM

## 2023-03-17 DIAGNOSIS — R26.9 GAIT DIFFICULTY: ICD-10-CM

## 2023-03-17 DIAGNOSIS — R54 FRAILTY: ICD-10-CM

## 2023-03-17 PROCEDURE — 99213 OFFICE O/P EST LOW 20 MIN: CPT | Performed by: FAMILY MEDICINE

## 2023-03-17 NOTE — PROGRESS NOTES
"  Isabell is an 87 year old female here for follow up:    Isabell was living with her daughter in [facility] but now living by herself in [place]? She gets lots of help from her 2 daughters.    Poor gait and balance, vertigo, vaginal bleeding - she is a fall risk. She has not fallen recently. Her balance problems are not easy for her to describe. Not near syncope and not clearly vertigo but \"just off balance.\" This is random and not positional or exertional. She has no uterus but noticed bleeding \"like a period.\" This occurred only once and not since.  Evaluation and treatment:    ER visit 1/30/23 - nothing concerning was found (including pelvic exam).   Uses walker    We discussed differential including cerebellar dysfunction (CVA, posterior circulation stenosis), inner ear issue (PPV), orthostatic hypotension and generally cardiac (unlikely).   At this point I did not see a need for further evaluation like MRA, neurology referral etc since she would not like any invasive treatments.   But she agreed to a Home Care evaluation including RN/PT/OT/SW.    Underweight - she just doesn't have good appetite. No nausea, vomiting, diarrhea. She has occasional constipation relieved with prune juice. No depression. Sleeps well at night.  Evaluation and treatment:    Counseled.   She takes protein shake one can per day - I asked her to increase to 2 per day.   I gave her a target of 110#.    Remeron 15 mg at bedtime - no side effects.      TSH   Date Value Ref Range Status   11/17/2022 1.47 0.40 - 4.00 mU/L Final   09/16/2020 1.77 0.40 - 4.00 mU/L Final          Wt Readings from Last 5 Encounters:   03/17/23 43.8 kg (96 lb 9.6 oz)   02/03/23 42.6 kg (94 lb)   11/17/22 42.5 kg (93 lb 12.8 oz)   11/12/21 43.5 kg (96 lb)   09/16/20 44.7 kg (98 lb 9.6 oz)     Body mass index is 19.51 kg/m .      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 She has a number of the scattered on her skin.   She may come back if she wants more " freezing.     Right forearm skin lesion - present for about one year no change  Evaluation and treatment:    SK  Vs SCC.   I advised derm referral. She declined.     HTN - checks 132/72 136/82, 113/71, controlled in clinic.  Evaluation and treatment:   Lisinopril previously stopped due to cough.     Toprol - previously stopped due to some side effect.   Losartan 50 mg every day - no side effects.    Amlodipine 5 mg daily - no side effects.    BP Readings from Last 6 Encounters:   03/17/23 105/64   02/03/23 115/75   11/17/22 115/64   11/12/21 120/78   09/16/20 114/79   06/08/20 135/86     Last Comprehensive Metabolic Panel:  Sodium   Date Value Ref Range Status   11/17/2022 139 133 - 144 mmol/L Final   09/16/2020 137.3 132.6 - 141.4 mmol/L Final     Potassium   Date Value Ref Range Status   11/17/2022 4.2 3.4 - 5.3 mmol/L Final   09/16/2020 4.3 3.3 - 4.5 mmol/L Final     Chloride   Date Value Ref Range Status   11/17/2022 104 94 - 109 mmol/L Final   09/16/2020 97.4 (L) 98.0 - 110.0 mmol/L Final     Carbon Dioxide   Date Value Ref Range Status   09/16/2020 29.7 20.0 - 32.0 mmol/L Final     Carbon Dioxide (CO2)   Date Value Ref Range Status   11/17/2022 31 20 - 32 mmol/L Final     Anion Gap   Date Value Ref Range Status   11/17/2022 4 3 - 14 mmol/L Final   06/07/2016 7 3 - 14 mmol/L Final     Glucose   Date Value Ref Range Status   11/17/2022 109 (H) 70 - 99 mg/dL Final   09/16/2020 114.9 (H) 70.0 - 99.0 mg'dL Final     Urea Nitrogen   Date Value Ref Range Status   11/17/2022 25 7 - 30 mg/dL Final   09/16/2020 17.1 7.0 - 19.0 mg/dL Final     Creatinine   Date Value Ref Range Status   11/17/2022 0.54 0.52 - 1.04 mg/dL Final   09/16/2020 0.7 0.5 - 1.0 mg/dL Final     GFR Estimate   Date Value Ref Range Status   11/17/2022 89 >60 mL/min/1.73m2 Final     Comment:     Effective December 21, 2021 eGFRcr in adults is calculated using the 2021 CKD-EPI creatinine equation which includes age and gender (Quita et al., NEJM, DOI:  10.1056/ATRIwe8193155)   09/16/2020 >90 >60.0 mL/min/1.7 m2 Final     Calcium   Date Value Ref Range Status   11/17/2022 9.5 8.5 - 10.1 mg/dL Final   09/16/2020 10.2 (H) 8.5 - 10.1 mg/dL Final     CBC RESULTS: Recent Labs   Lab Test 11/17/22  1326   WBC 7.6   RBC 4.86   HGB 15.4   HCT 47.1*   MCV 97   MCH 31.7   MCHC 32.7   RDW 12.6          Dyslipidemia - No history of CAD, CVA, PAD or diabetes.  But has h/o AAA - see below.  Evaluation and treatment:    No need to check cholesterol since she won't take statins.    AAA - 3.4 cm on 5/1/13. Repeat on 6/5/14 3.7 cm. Repeat on 6/8/15 4.4 cm. Repeat 10/9/18 4.5 cm.   Evaluation and treatment:     we discussed risk of rupture which can be catastrophic.   She declines further follow up, citing she would not mild dying in that way. Reiterated today (2/3/23)    Tobacco abuse - has to smoke outside since she lives in a senior apartment. A few cigarettes per day  Evaluation and treatment:    not ready to quit. Declines medication or counseling.     Chronic low back pain - has had 4 surgeries. Uses TENS unit.    Osteopenia - 10/9/18.  Plan for today:    Vitamin D 2000 units daily, but not calcium due to h/o kidney stones.    Declined repeat DEXA previously but accepted today - ordered    Previous shingles - around eyes.     Surgical history  -    carpal tunnel left wrist,    neck surgery x 2,    back surgery x 4,    hysterectomy,    appendectomy,    cataract surgeries.     Preventive - declines second Shingrix, citing she had to pay out of pocket for the 1st one.     Immunization History   Administered Date(s) Administered     COVID-19 Vaccine 12+ (Pfizer) 03/05/2021, 03/26/2021, 10/11/2021     COVID-19 Vaccine 18+ (Moderna) 04/11/2022     COVID-19 Vaccine Bivalent Booster 18+ (Moderna) 09/26/2022     FLU 6-35 months 02/07/2005     Flu, Unspecified 11/13/1998, 11/03/2000, 11/14/2001     Influenza (IIV3) PF 10/15/1999, 10/17/2003, 11/08/2005, 11/15/2007, 11/18/2008,  "10/30/2011, 09/27/2012, 09/24/2021     Influenza Vaccine 50-64 or 18-64 w/egg allergy (Flublok) 10/24/2019     Influenza Vaccine >6 months (Alfuria,Fluzone) 09/01/2013, 09/25/2020     Influenza,INJ,MDCK,PF,Quad >6mo(Flucelvax) 09/26/2022     Pneumo Conj 13-V (2010&after) 12/04/2015     Pneumococcal 23 valent 10/15/1999, 11/14/2001, 06/30/2006     Poliovirus, inactivated (IPV) 02/02/2012     TD (ADULT, 7+) 11/15/2007, 11/18/2008     TDAP Vaccine (Boostrix) 09/19/2019     Td (Adult), Adsorbed 07/25/1997     Twinrix A/B 02/02/2012, 03/05/2012     Typhoid IM 02/02/2012     Zoster vaccine recombinant adjuvanted (SHINGRIX) 11/12/2021     Zoster vaccine, live 08/15/2017     Mammogram: No more mammograms.     Colonoscopy: Colonoscopy 2/24/11. Advised to repeat in 5 years. Declines further colonoscopy.     Advanced Directive: on file     SH:    She lives with her daughter in [facility] now.  Marital status:   Kids: 2  Employment: retired  Exercise: no  Tobacco: down to 1-2 per day  Etoh: no  Recreational drugs: no  Caffeine: 6-8 per day        OBJECTIVE:   /64   Pulse (!) 121   Temp 97.6  F (36.4  C) (Tympanic)   Resp 16   Ht 1.499 m (4' 11\")   Wt 43.8 kg (96 lb 9.6 oz)   SpO2 94%   BMI 19.51 kg/m   Estimated body mass index is 19.51 kg/m  as calculated from the following:    Height as of this encounter: 1.499 m (4' 11\").    Weight as of this encounter: 43.8 kg (96 lb 9.6 oz).  Physical Exam    GENERAL: healthy, alert and no distress. Here with daughter.    Assessment and Plan - Decision Making    1. Underweight    Per HPI    - Miscellaneous Order for DME - ONLY FOR DME  - Home Care Referral    2. Gait difficulty    Per HPI    - Home Care Referral    3. Frailty    Per HPI    - Home Care Referral      Written instructions given as follows:    Patient Instructions   Continue your current treatment.      "

## 2023-03-22 ENCOUNTER — TELEPHONE (OUTPATIENT)
Dept: FAMILY MEDICINE | Facility: CLINIC | Age: 88
End: 2023-03-22
Payer: COMMERCIAL

## 2023-03-22 NOTE — TELEPHONE ENCOUNTER
[facility] calling stating they got a home care referral last week for this patient but they are out of network for patient.     Nehal Guerrier BSN, RN

## 2023-04-04 ENCOUNTER — TELEPHONE (OUTPATIENT)
Dept: FAMILY MEDICINE | Facility: CLINIC | Age: 88
End: 2023-04-04
Payer: COMMERCIAL

## 2023-04-04 NOTE — TELEPHONE ENCOUNTER
Forms/Letter Request    Type of form/letter: Handicap Parking permit    Have you been seen for this request: Yes     Do we have the form/letter: Yes:  Started a new form for patient, as she states she has lost her handicap card for her vehicle.    Who is the form from?     Where did/will the form come from?     When is form/letter needed by: No rush.    How would you like the form/letter returned:     Patient Notified form requests are processed in 3-5 business days:Yes    Could we send this information to you in [place] or would you prefer to receive a phone call?:   Patient would prefer a phone call   Okay to leave a detailed message?: Yes at Home number on file [phone] (home)

## 2023-04-05 NOTE — TELEPHONE ENCOUNTER
Please let patient/family know I am in clinic April 20 and I will fill it out by the end of that week.    Then put the disability parking form on my desk.    Thank you.    Alejandro Kendrick M.D.

## 2023-04-05 NOTE — TELEPHONE ENCOUNTER
Called and spoke to patient's daughter. I informed her that Dr Kendrick will be back in the office on 4/20/23. Daughter said that it was fine. They would like form mailed to patient's home, when it is done.    Form is in Dr Kendrick's basket to complete.Macrina Mora MA/CAMERON

## 2023-05-03 DIAGNOSIS — I10 ESSENTIAL HYPERTENSION WITH GOAL BLOOD PRESSURE LESS THAN 140/90: ICD-10-CM

## 2023-05-03 RX ORDER — LOSARTAN POTASSIUM 50 MG/1
50 TABLET ORAL DAILY
Qty: 90 TABLET | Refills: 0 | Status: SHIPPED | OUTPATIENT
Start: 2023-05-03 | End: 2023-08-01

## 2023-05-04 ENCOUNTER — TELEPHONE (OUTPATIENT)
Dept: FAMILY MEDICINE | Facility: CLINIC | Age: 88
End: 2023-05-04
Payer: COMMERCIAL

## 2023-05-04 NOTE — TELEPHONE ENCOUNTER
Patient can't find completed disability parking certificate from a month ago and is requesting a new one. I have started form and placed in your basket.        Forms/Letter Request    Type of form/letter: Handicap Parking permit    Have you been seen for this request: Yes     Do we have the form/letter: Yes:     Who is the form from? [place] Department of Public Safety (if other please explain)    Where did/will the form come from? I printed for patient    When is form/letter needed by: ASAP    How would you like the form/letter returned: Mail  Is this the correct address?: Yes  [address]   [facility] [facility] [place] [address]    Patient Notified form requests are processed in 3-5 business days:Yes    Could we [facility] this information to you [facility] or would you prefer to receive a phone call?:   Patient would prefer a phone call   Okay to leave a detailed message?: Yes at Cell number on file:    Telephone Information:   Mobile [phone]

## 2023-07-29 DIAGNOSIS — I10 ESSENTIAL HYPERTENSION WITH GOAL BLOOD PRESSURE LESS THAN 140/90: ICD-10-CM

## 2023-07-31 NOTE — TELEPHONE ENCOUNTER
Medication Question or Refill    Contacts         Type Contact Phone/Fax    07/29/2023 06:06 AM CDT Interface (Incoming) [id number] - [place] - [address]  AT [facility] [phone]            What medication are you calling about (include dose and sig)?:   losartan (COZAAR) 50 MG tablet 90 tablet 0 5/3/2023  No   Sig - Route: Take 1 tablet (50 mg) by mouth daily - Oral       Preferred Pharmacy:   [facility] [address]  [address]  [facility] [address]  Phone: [phone] Fax: [phone]    [id number] - [place] [address] [facility] [address] [place] [address] [facility] [address]  Phone: [phone] Fax: [phone]    [facility] - [facility] [address]  [address]  [facility] [address]  Phone: [phone] Fax: [phone]    [id number] [address] [address] [address]  Phone: [phone] Fax: [phone]    [id number] - [place] - [address]  AT [facility]  [address] [facility] [address]  Phone: [phone] Fax: [phone]      Controlled Substance Agreement on file:   CSA -- Patient Level:    CSA: None found at the patient level.       Who prescribed the medication?: Dr. Kendrick    Do you need a refill? Yes    When did you use the medication last? Daily    Patient offered an appointment? Yes: scheduled with Dr. Perea on 11/20/23 for Annual Wellness.    Do you have any questions or concerns?  No      Could we send this information to you in MyChart or would you prefer to receive a phone call?:   No preference   Okay to leave a detailed message?: Yes at Cell number on file:    Telephone Information:   Mobile [phone]

## 2023-08-01 RX ORDER — LOSARTAN POTASSIUM 50 MG/1
TABLET ORAL
Qty: 90 TABLET | Refills: 0 | Status: SHIPPED | OUTPATIENT
Start: 2023-08-01 | End: 2023-10-27

## 2023-10-17 ENCOUNTER — TELEPHONE (OUTPATIENT)
Dept: FAMILY MEDICINE | Facility: CLINIC | Age: 88
End: 2023-10-17
Payer: COMMERCIAL

## 2023-10-17 NOTE — TELEPHONE ENCOUNTER
Patient Quality Outreach    Patient is due for the following:   Colon Cancer Screening  Physical Annual Wellness Visit      Topic Date Due    Flu Vaccine (1) 09/01/2023    COVID-19 Vaccine (7 - 2023-24 season) 09/01/2023       Next Steps:   Patient has upcoming appointment, these items will be addressed at that time.    Type of outreach:    Chart review performed, no outreach needed.      Questions for provider review:    None           JENNIFER BENNETT MA

## 2023-10-27 DIAGNOSIS — I10 ESSENTIAL HYPERTENSION WITH GOAL BLOOD PRESSURE LESS THAN 140/90: ICD-10-CM

## 2023-10-27 RX ORDER — LOSARTAN POTASSIUM 50 MG/1
TABLET ORAL
Qty: 90 TABLET | Refills: 0 | Status: SHIPPED | OUTPATIENT
Start: 2023-10-27 | End: 2023-11-20

## 2023-11-20 ENCOUNTER — OFFICE VISIT (OUTPATIENT)
Dept: FAMILY MEDICINE | Facility: CLINIC | Age: 88
End: 2023-11-20
Payer: COMMERCIAL

## 2023-11-20 VITALS
BODY MASS INDEX: 17.98 KG/M2 | HEART RATE: 79 BPM | HEIGHT: 59 IN | DIASTOLIC BLOOD PRESSURE: 80 MMHG | SYSTOLIC BLOOD PRESSURE: 123 MMHG | WEIGHT: 89.2 LBS | TEMPERATURE: 97.7 F | OXYGEN SATURATION: 94 % | RESPIRATION RATE: 18 BRPM

## 2023-11-20 DIAGNOSIS — I10 ESSENTIAL HYPERTENSION WITH GOAL BLOOD PRESSURE LESS THAN 140/90: ICD-10-CM

## 2023-11-20 DIAGNOSIS — R54 FRAILTY: ICD-10-CM

## 2023-11-20 DIAGNOSIS — Z00.00 ENCOUNTER FOR MEDICARE ANNUAL WELLNESS EXAM: ICD-10-CM

## 2023-11-20 DIAGNOSIS — R63.6 UNDERWEIGHT: ICD-10-CM

## 2023-11-20 DIAGNOSIS — I71.40 ABDOMINAL AORTIC ANEURYSM (AAA) WITHOUT RUPTURE, UNSPECIFIED PART (H): ICD-10-CM

## 2023-11-20 DIAGNOSIS — Z00.00 ENCOUNTER FOR SUBSEQUENT ANNUAL WELLNESS VISIT IN MEDICARE PATIENT: Primary | ICD-10-CM

## 2023-11-20 DIAGNOSIS — Z29.11 NEED FOR VACCINATION AGAINST RESPIRATORY SYNCYTIAL VIRUS: ICD-10-CM

## 2023-11-20 PROCEDURE — G0439 PPPS, SUBSEQ VISIT: HCPCS | Performed by: FAMILY MEDICINE

## 2023-11-20 PROCEDURE — 99214 OFFICE O/P EST MOD 30 MIN: CPT | Mod: 25 | Performed by: FAMILY MEDICINE

## 2023-11-20 RX ORDER — LOSARTAN POTASSIUM 50 MG/1
50 TABLET ORAL DAILY
Qty: 90 TABLET | Refills: 3 | Status: SHIPPED | OUTPATIENT
Start: 2023-11-20 | End: 2024-09-30

## 2023-11-20 RX ORDER — RESPIRATORY SYNCYTIAL VIRUS VACCINE 120MCG/0.5
0.5 KIT INTRAMUSCULAR ONCE
Qty: 1 EACH | Refills: 0 | Status: CANCELLED | OUTPATIENT
Start: 2023-11-20 | End: 2023-11-20

## 2023-11-20 ASSESSMENT — ENCOUNTER SYMPTOMS
NERVOUS/ANXIOUS: 0
HEADACHES: 0
NAUSEA: 0
DYSURIA: 0
SORE THROAT: 0
SHORTNESS OF BREATH: 0
HEARTBURN: 0
FREQUENCY: 0
HEMATOCHEZIA: 0
CONSTIPATION: 0
EYE PAIN: 0
DIZZINESS: 0
COUGH: 0
MYALGIAS: 0
WEAKNESS: 1
HEMATURIA: 0
ARTHRALGIAS: 0
JOINT SWELLING: 0
PARESTHESIAS: 0
DIARRHEA: 0
CHILLS: 0
ABDOMINAL PAIN: 0
PALPITATIONS: 0
FEVER: 0
BREAST MASS: 0

## 2023-11-20 ASSESSMENT — ACTIVITIES OF DAILY LIVING (ADL)
CURRENT_FUNCTION: TRANSPORTATION REQUIRES ASSISTANCE
CURRENT_FUNCTION: SHOPPING REQUIRES ASSISTANCE
CURRENT_FUNCTION: PREPARING MEALS REQUIRES ASSISTANCE

## 2023-11-20 ASSESSMENT — PAIN SCALES - GENERAL: PAINLEVEL: NO PAIN (0)

## 2023-11-20 NOTE — COMMUNITY RESOURCES LIST (ENGLISH)
11/20/2023   [facility]  N/A  For questions about this resource list or additional care needs, please contact your primary care clinic or care manager.  Phone: [phone]   Email: N/A   Address: [address] [place] [address]   Hours: N/A        Hotlines and Helplines       Hotline - Housing crisis  1  [facility] Distance: 5.4 miles      Phone/Virtual   [address] [place] [address]  Language: English  Hours: Mon - Sun Open 24 Hours   Phone: (792) 339-1053 Email: communications@[facility].org Website: https://[facility]-[place].org/oursaviourshousing/     2  [facility] Distance: 6.92 miles      Phone/Virtual   [address] [place] [address]  Language: English  Hours: Mon - Sun Open 24 Hours   Phone: (690) 119-9295 Email: info@[facility].org Website: http://www.[facility].org          Housing       Coordinated Entry access point  3  [facility] - Coordinated Access to Housing and Shelter ([facility]) - Coordinated Access Distance: 2.76 miles      In-Person, Phone/Virtual   [address] [place] [address]  Language: English  Hours: Mon - Fri 8:00 AM - 4:30 PM  Fees: Free   Phone: (231) 452-9884 Website: https://www.[facility]./residents/assistance-support/assistance/housing-services-support     4  [facility] Distance: 4.45 miles      In-Person, Phone/Virtual   [address] [place] [address]  Language: English, Hebrew  Hours: Mon - Fri 8:30 AM - 4:30 PM  Fees: Free   Phone: (514) 663-1994 Email: info@[facility].org Website: https://www.[facility].org/locations/[facility]-clinic/     Drop-in center or day shelter  5  [facility] Distance: 4.94 miles      In-Person   [address] [place] [address]  Language: English  Hours: Mon - Fri 9:00 AM - 4:00 PM  Fees: Free   Phone: (259) 502-4209 Email: frontkaterinak@[facility].org Website:  http://[facility].org     6  [facility] [place] [facility] Distance: 5.26 miles      In-Person   [address] [place] [address]  Language: English, American, Indonesian  Hours: Mon - Sat 7:00 AM - 3:00 PM  Fees: Free, Self Pay   Phone: (122) 939-5286 Email: info@[facility] Website: https://www.[facility]/locations/opportunity-center/     Housing search assistance  7  [facility] - Mental Health Crisis Housing Search Assistance Distance: 2.68 miles      In-Person, Phone/Virtual   [address] [facility] [address] [place] [address]  Language: English  Hours: Mon - Tue 8:00 AM - 4:30 PM , Wed 8:00 AM - 6:00 PM , Thu - Fri 8:00 AM - 4:30 PM  Fees: Free   Phone: (620) 874-9984 Email: [facility]@co.[facility] Website: https://www.[facility]/health-medical/clinics-services/mental-health/adult-mental-health     8  [facility] Distance: 4.94 miles      In-Person   [address] [place] [address]  Language: English, American, Indonesian  Hours: Mon - Fri 8:30 AM - 4:30 PM  Fees: Free   Phone: (609) 728-8172 Email: jagdish@[facility].org Website: http://www.[facility].org/     Shelter for families  9  [facility] Distance: 14 miles      In-Person   [address] [place] [address]  Language: English  Hours: Mon - Fri 3:00 PM - 9:00 AM , Sat - Sun Open 24 Hours  Fees: Free   Phone: (836) 315-5643 Ext.1 Website: https://www.[facility].org/2020/07/03/emergency-family-shelter/     Shelter for individuals  10  [facility] [place] [facility] [place] [facility] [place] [facility] Distance: 4.38 miles      In-Person   [address] [place] [address]  Language: English  Hours: Mon - Sun 5:00 PM - 10:00 AM  Fees: Free, Self Pay   Phone: (238) 871-2636 Email: info@[facility] Website:  https://www.[facility].org/locations/[facility]/     11  [facility] Distance: 5.4 miles      In-Person   [address] [place] [address]  Language: English  Hours: Mon - Sun Open 24 Hours  Fees: Free   Phone: (867) 303-3493 Email: communications@[facility].org Website: https://[facility].org/oursaviourshousing/          Important Numbers & Websites       Emergency Services   [address]  [place] [address]  Poison Control   (297) 477-8992  Suicide Prevention Lifeline   (411) 826-3285 (TALK)  Child Abuse Hotline   (598) 997-9862 (4-A-Child)  Sexual Assault Hotline   (847) 390-8459 (HOPE)  National Runaway Safeline   (336) 222-6280 (RUNAWAY)  All-Options Talkline   (927) 277-1962  Substance Abuse Referral   (318) 130-7127 (HELP)

## 2023-11-20 NOTE — PATIENT INSTRUCTIONS
"Patient Education   Personalized Prevention Plan  You are due for the preventive services outlined below.  Your care team is available to assist you in scheduling these services.  If you have already completed any of these items, please share that information with your care team to update in your medical record.  Health Maintenance Due   Topic Date Due     RSV VACCINE (Pregnancy & 60+) (1 - 1-dose 60+ series) Never done     Polio Vaccine (2 of 3 - Adult catch-up series) 03/01/2012     Colorectal Cancer Screening  02/24/2016     Cholesterol Lab  09/03/2020     Kidney Microalbumin Urine Test  09/03/2020     Basic Metabolic Panel  11/17/2023     ANNUAL REVIEW OF HM ORDERS  11/17/2023     Learning About Being Physically Active  What is physical activity?     Being physically active means doing any kind of activity that gets your body moving.  The types of physical activity that can help you get fit and stay healthy include:  Aerobic or \"cardio\" activities. These make your heart beat faster and make you breathe harder, such as brisk walking, riding a bike, or running. They strengthen your heart and lungs and build up your endurance.  Strength training activities. These make your muscles work against, or \"resist,\" something. Examples include lifting weights or doing push-ups. These activities help tone and strengthen your muscles and bones.  Stretches. These let you move your joints and muscles through their full range of motion. Stretching helps you be more flexible.  Reaching a balance between these three types of physical activity is important because each one contributes to your overall fitness.  What are the benefits of being active?  Being active is one of the best things you can do for your health. It helps you to:  Feel stronger and have more energy to do all the things you like to do.  Focus better at school or work.  Feel, think, and sleep better.  Reach and stay at a healthy weight.  Lose fat and build lean " "muscle.  Lower your risk for serious health problems, including diabetes, heart attack, high blood pressure, and some cancers.  Keep your heart, lungs, bones, muscles, and joints strong and healthy.  How can you make being active part of your life?  Start slowly. Make it your long-term goal to get at least 30 minutes of exercise on most days of the week. Walking is a good choice. You also may want to do other activities, such as running, swimming, cycling, or playing tennis or team sports.  Pick activities that you like--ones that make your heart beat faster, your muscles stronger, and your muscles and joints more flexible. If you find more than one thing you like doing, do them all. You don't have to do the same thing every day.  Get your heart pumping every day. Any activity that makes your heart beat faster and keeps it at that rate for a while counts.  Here are some great ways to get your heart beating faster:  Go for a brisk walk, run, or hike.  Go for a swim or bike ride.  Take an online exercise class or dance.  Play a game of touch football, basketball, or soccer.  Play tennis, pickleball, or racquetball.  Climb stairs.  Even some household chores can be aerobic. Just do them at a faster pace. Raking or mowing the lawn, sweeping the garage, and vacuuming and cleaning your home all can help get your heart rate up.  Strengthen your muscles during the week. You don't have to lift heavy weights or grow big, bulky muscles to get stronger. Doing a few simple activities that make your muscles work against, or \"resist,\" something can help you get stronger. Aim for at least twice a week.  For example, you can:  Do push-ups or sit-ups, which use your own body weight as resistance.  Lift weights or dumbbells or use stretch bands at home or in a gym or community center.  Stretch your muscles often. Stretching will help you as you become more active. It can help you stay flexible and loosen tight muscles. It can also " "help improve your balance and posture and can be a great way to relax.  Be sure to stretch the muscles you'll be using when you work out. It's best to warm your muscles slightly before you stretch them. Walk or do some other light aerobic activity for a few minutes. Then start stretching.  When you stretch your muscles:  Do it slowly. Stretching is not about going fast or making sudden movements.  Don't push or bounce during a stretch.  Hold each stretch for at least 15 to 30 seconds, if you can. You should feel a stretch in the muscle, but not pain.  Breathe out as you do the stretch. Then breathe in as you hold the stretch. Don't hold your breath.  If you're worried about how more activity might affect your health, have a checkup before you start. Follow any special advice your doctor gives you for getting a smart start.  Where can you learn more?  Go to https://www.[employer].OpTrip/patiented  Enter W332 in the search box to learn more about \"Learning About Being Physically Active.\"  Current as of: June 6, 2023               Content Version: 13.8    8358-8208 [employer].   Care instructions adapted under license by your healthcare professional. If you have questions about a medical condition or this instruction, always ask your healthcare professional. [employer] disclaims any warranty or liability for your use of this information.      Learning About Dietary Guidelines  What are the Dietary Guidelines for Americans?     Dietary Guidelines for Americans provide tips for eating well and staying healthy. This helps reduce the risk for long-term (chronic) diseases.  These guidelines recommend that you:  Eat and drink the right amount for you. The U.S. government's food guide is called MyPlate. It can help you make your own well-balanced eating plan.  Try to balance your eating with your activity. This helps you stay at a healthy weight.  Drink alcohol in moderation, if at all.  Limit foods " "high in salt, saturated fat, trans fat, and added sugar.  These guidelines are from the U.S. Department of Agriculture and the U.S. Department of Health and Human Services. They are updated every 5 years.  What is MyPlate?  MyPlate is the U.S. government's food guide. It can help you make your own well-balanced eating plan. A balanced eating plan means that you eat enough, but not too much, and that your food gives you the nutrients you need to stay healthy.  MyPlate focuses on eating plenty of whole grains, fruits, and vegetables, and on limiting fat and sugar. It is available online at www.ChooseMyPlate.gov.  How can you get started?  If you're trying to eat healthier, you can slowly change your eating habits over time. You don't have to make big changes all at once. Start by adding one or two healthy foods to your meals each day.  Grains  Choose whole-grain breads and cereals and whole-wheat pasta and whole-grain crackers.  Vegetables  Eat a variety of vegetables every day. They have lots of nutrients and are part of a heart-healthy diet.  Fruits  Eat a variety of fruits every day. Fruits contain lots of nutrients. Choose fresh fruit instead of fruit juice.  Protein foods  Choose fish and lean poultry more often. Eat red meat and fried meats less often. Dried beans, tofu, and nuts are also good sources of protein.  Dairy  Choose low-fat or fat-free products from this food group. If you have problems digesting milk, try eating cheese or yogurt instead.  Fats and oils  Limit fats and oils if you're trying to cut calories. Choose healthy fats when you cook. These include canola oil and olive oil.  Where can you learn more?  Go to https://www.[employer].net/patiented  Enter D676 in the search box to learn more about \"Learning About Dietary Guidelines.\"  Current as of: February 28, 2023               Content Version: 13.8    7595-4086 [employer].   Care instructions adapted under license by your " healthcare professional. If you have questions about a medical condition or this instruction, always ask your healthcare professional. [employer] disclaims any warranty or liability for your use of this information.      Activities of Daily Living    Your Health Risk Assessment indicates you have difficulties with activities of daily living such as housework, bathing, preparing meals, taking medication, etc. Please make a follow up appointment for us to address this issue in more detail.

## 2023-11-20 NOTE — PROGRESS NOTES
"SUBJECTIVE:   Isabell is a 88 year old, presenting for the following:  Patient new to myself. History under weight, gait difficulty, frailty, htn, osteopenia and tobacco abuse  Isabell is living with her daughter in [place].  One other daughter and  3 sons - helpful. 8 grandkids and 2 great grandkids.  .   No falls. Appetite  - not great but improving moved 3 weeks. Sleep overall ok.   Remeron -  didn't. No selective serotonin reuptake inhibitor in past. Scales at home.   Taking ensure.   No nausea, vomiting or diarrhea or constipation. No black/bloody stools. No urine changes or hematuria.   No abdominal pain. Emotionally doing ok.   Home blood pressure borderline high. No chest pain.   No albuterol mdi needed/water. 1-2 cigarettes/day.   Rare ALCOHOL.     Wellness Visit and Hypertension        11/20/2023     8:58 AM   Additional Questions   Roomed by KESHIA Ruffin CMA   Accompanied by Daughter       Are you in the first 12 months of your Medicare coverage?  No    Healthy Habits:     In general, how would you rate your overall health?  Good    Frequency of exercise:  None    Do you usually eat at least 4 servings of fruit and vegetables a day, include whole grains    & fiber and avoid regularly eating high fat or \"junk\" foods?  No    Taking medications regularly:  Yes    Medication side effects:  None    Ability to successfully perform activities of daily living:  Transportation requires assistance, shopping requires assistance and preparing meals requires assistance    Home Safety:  No safety concerns identified    Hearing Impairment:  No hearing concerns    In the past 6 months, have you been bothered by leaking of urine?  No    In general, how would you rate your overall mental or emotional health?  Good      Today's PHQ-2 Score:       11/20/2023     8:47 AM   PHQ-2 ( 1999 [employer])   Q1: Little interest or pleasure in doing things 0   Q2: Feeling down, depressed or hopeless 0   PHQ-2 Score 0   Q1: Little " interest or pleasure in doing things Not at all   Q2: Feeling down, depressed or hopeless Not at all   PHQ-2 Score 0       Have you ever done Advance Care Planning? (For example, a Health Directive, POLST, or a discussion with a medical provider or your loved ones about your wishes): Yes, advance care planning is on file.    Hearing aides.   Fall risk  Fallen 2 or more times in the past year?: No  Any fall with injury in the past year?: No    Cognitive Screening   1) Repeat 3 items (Leader, Season, Table)    2) Clock draw: NORMAL  3) 3 item recall: =Recalls 2 objects   Results: NORMAL clock, 1-2 items recalled: COGNITIVE IMPAIRMENT LESS LIKELY    Mini-CogTM Copyright S George. Licensed by the author for use in [facility]; reprinted with permission (lazaro@[email or web address]). All rights reserved.      Do you have sleep apnea, excessive snoring or daytime drowsiness? : no    Reviewed and updated as needed this visit by clinical staff   Tobacco  Allergies  Meds              Reviewed and updated as needed this visit by Provider                 Social History     Tobacco Use    Smoking status: Every Day     Packs/day: 0.25     Years: 50.00     Additional pack years: 0.00     Total pack years: 12.50     Types: Cigarettes    Smokeless tobacco: Never   Substance Use Topics    Alcohol use: Yes     Comment: occasionally             11/20/2023     8:47 AM   Alcohol Use   Prescreen: >3 drinks/day or >7 drinks/week? No     Do you have a current opioid prescription? No  Do you use any other controlled substances or medications that are not prescribed by a provider? None        Current providers sharing in care for this patient include:   Patient Care Team:  No Ref-Primary, Physician as PCP - Marybeth Hogan MD as MD (Family Practice)  Alejandro Kendrick MD as Assigned PCP    The following health maintenance items are reviewed in Epic and correct as of today:  Health Maintenance   Topic Date Due    RSV VACCINE  "(Pregnancy & 60+) (1 - 1-dose 60+ series) Never done    IPV IMMUNIZATION (2 of 3 - Adult catch-up series) 03/01/2012    COLORECTAL CANCER SCREENING  02/24/2016    LIPID  09/03/2020    MICROALBUMIN  09/03/2020    BMP  11/17/2023    ANNUAL REVIEW OF HM ORDERS  11/17/2023    NICOTINE/TOBACCO CESSATION COUNSELING Q 1 YR  11/20/2024    MEDICARE ANNUAL WELLNESS VISIT  11/20/2024    FALL RISK ASSESSMENT  11/20/2024    ADVANCE CARE PLANNING  11/20/2028    DTAP/TDAP/TD IMMUNIZATION (2 - Td or Tdap) 09/19/2029    PHQ-2 (once per calendar year)  Completed    INFLUENZA VACCINE  Completed    Pneumococcal Vaccine: 65+ Years  Completed    URINALYSIS  Completed    ZOSTER IMMUNIZATION  Completed    COVID-19 Vaccine  Completed    HPV IMMUNIZATION  Aged Out    MENINGITIS IMMUNIZATION  Aged Out    RSV MONOCLONAL ANTIBODY  Aged Out     Pertinent mammograms are reviewed under the imaging tab.    Review of Systems   Constitutional:  Negative for chills and fever.   HENT:  Negative for congestion, ear pain, hearing loss and sore throat.    Eyes:  Negative for pain and visual disturbance.   Respiratory:  Negative for cough and shortness of breath.    Cardiovascular:  Negative for chest pain, palpitations and peripheral edema.   Gastrointestinal:  Negative for abdominal pain, constipation, diarrhea, heartburn, hematochezia and nausea.   Breasts:  Negative for tenderness, breast mass and discharge.   Genitourinary:  Negative for dysuria, frequency, genital sores, hematuria, pelvic pain, urgency, vaginal bleeding and vaginal discharge.   Musculoskeletal:  Negative for arthralgias, joint swelling and myalgias.   Skin:  Negative for rash.   Neurological:  Positive for weakness. Negative for dizziness, headaches and paresthesias.   Psychiatric/Behavioral:  Negative for mood changes. The patient is not nervous/anxious.        OBJECTIVE:   /80   Pulse 79   Temp 97.7  F (36.5  C) (Oral)   Resp 18   Ht 1.499 m (4' 11\")   Wt 40.5 kg (89 lb " "3.2 oz)   SpO2 94%   BMI 18.02 kg/m   Estimated body mass index is 18.02 kg/m  as calculated from the following:    Height as of this encounter: 1.499 m (4' 11\").    Weight as of this encounter: 40.5 kg (89 lb 3.2 oz).  Physical Exam  GENERAL: healthy, alert and no distress  EYES: Eyes grossly normal to inspection, PERRL and conjunctivae and sclerae normal  HENT: ear canals and TM's normal, nose and mouth without ulcers or lesions  NECK: no adenopathy, no asymmetry, masses, or scars and thyroid normal to palpation  RESP: lungs clear to auscultation - no rales, rhonchi or wheezes  CV: regular rate and rhythm, normal S1 S2, no S3 or S4, no murmur, click or rub, no peripheral edema and peripheral pulses strong  ABDOMEN: soft, nontender, no hepatosplenomegaly, no masses and bowel sounds normal  MS: no gross musculoskeletal defects noted, no edema  SKIN: no suspicious lesions or rashes  NEURO: grossly non-focal. Global weakness  PSYCH: mentation appears normal, affect normal/bright    ASSESSMENT / PLAN:   ASSESSMENT / PLAN:  (Z00.00) Encounter for subsequent annual wellness visit in Medicare patient  (primary encounter diagnosis)  Comment: generally doing ok and great support system. Memory ok but high fall risk  Plan: vitaminD    (Z29.11) Need for vaccination against respiratory syncytial virus    Plan: patient will get at pharamcy    (I10) Essential hypertension with goal blood pressure less than 140/90  Comment: stable  Plan: losartan (COZAAR) 50 MG tablet        Self-monitor. Will lower dosage if too low and double if too high.     (R63.6) Underweight  Comment: unclear etiology  Plan: will hopefully increase with livign with daugher now. Push protein/fat. Recheck in 6 months  Consider zoloft to increase appetite    (R54) Frailty    Plan: Recheck in 6 months  Consider home healthy/hospice if worse. Patient NOT Interested in any aggressive cares/treatments/etc.     (I71.40) Abdominal aortic aneurysm (AAA) without " rupture, unspecified part (H24)  Plan: monitor blood pressure.       Patient has been advised of split billing requirements and indicates understanding: Yes      COUNSELING:  Reviewed preventive health counseling, as reflected in patient instructions       Regular exercise       Healthy diet/nutrition       Vision screening       Hearing screening       Dental care       Bladder control       Fall risk prevention        She reports that she has been smoking cigarettes. She has a 12.50 pack-year smoking history. She has never used smokeless tobacco.  Nicotine/Tobacco Cessation Plan:   Information offered: Patient not interested at this time      Appropriate preventive services were discussed with this patient, including applicable screening as appropriate for fall prevention, nutrition, physical activity, Tobacco-use cessation, weight loss and cognition.  Checklist reviewing preventive services available has been given to the patient.    Reviewed patients plan of care and provided an AVS. The Intermediate Care Plan ( asthma action plan, low back pain action plan, and migraine action plan) for Isabell meets the Care Plan requirement. This Care Plan has been established and reviewed with the Patient and daughter.          Guzman Perea MD  [facility]    Identified Health Risks:  I have reviewed Opioid Use Disorder and Substance Use Disorder risk factors and made any needed referrals.   She is at risk for lack of exercise and has been provided with information to increase physical activity for the benefit of her well-being.  The patient was counseled and encouraged to consider modifying their diet and eating habits. She was provided with information on recommended healthy diet options.

## 2023-12-28 ENCOUNTER — E-VISIT (OUTPATIENT)
Dept: FAMILY MEDICINE | Facility: CLINIC | Age: 88
End: 2023-12-28
Payer: COMMERCIAL

## 2023-12-28 DIAGNOSIS — R21 RASH: Primary | ICD-10-CM

## 2023-12-28 PROCEDURE — 99421 OL DIG E/M SVC 5-10 MIN: CPT | Performed by: FAMILY MEDICINE

## 2023-12-28 RX ORDER — TRIAMCINOLONE ACETONIDE 0.25 MG/G
CREAM TOPICAL 2 TIMES DAILY PRN
Qty: 15 G | Refills: 1 | Status: SHIPPED | OUTPATIENT
Start: 2023-12-28

## 2024-02-01 DIAGNOSIS — I10 ESSENTIAL HYPERTENSION WITH GOAL BLOOD PRESSURE LESS THAN 140/90: ICD-10-CM

## 2024-02-01 RX ORDER — AMLODIPINE BESYLATE 5 MG/1
5 TABLET ORAL DAILY
Qty: 90 TABLET | Refills: 2 | Status: SHIPPED | OUTPATIENT
Start: 2024-02-01 | End: 2024-09-30

## 2024-02-02 DIAGNOSIS — I10 ESSENTIAL HYPERTENSION WITH GOAL BLOOD PRESSURE LESS THAN 140/90: ICD-10-CM

## 2024-02-02 RX ORDER — LOSARTAN POTASSIUM 50 MG/1
50 TABLET ORAL DAILY
Qty: 90 TABLET | Refills: 3 | OUTPATIENT
Start: 2024-02-02

## 2024-08-26 ENCOUNTER — NURSE TRIAGE (OUTPATIENT)
Dept: FAMILY MEDICINE | Facility: CLINIC | Age: 89
End: 2024-08-26
Payer: COMMERCIAL

## 2024-08-26 NOTE — TELEPHONE ENCOUNTER
"Nurse Triage SBAR    Is this a 2nd Level Triage? YES, LICENSED PRACTITIONER REVIEW IS REQUIRED    Situation: Isabell calling in to get an appointment for abdominal pain.      Background: Isabell has been having abdominal fullness discomfort for a few months now.  She is eating and drinking less and daughter states \"smoking more.\"    Hisotry of diverticulosis, AAA, and HTN    Assessment: Isabell states that if she eats anything she immediately gets full.  She is says it is not pain in her upper abdomen but discomfort.  She says that she sometimes gets cramps in her lower abdomen.  Daughter who is also on phone, states that she eats very little and is not drinking as much.  Patient also states that her Bowel Movements are normal.  She sometimes has a swollen abdomen.      Protocol Recommended Disposition:   See in Office Today    Recommendation: No appointment available today with Dr Perea.  Patient states she thinks tomorrow would be fine.  Appointment scheduled with Dr Perea tomorrow 8/27/24.  Daughter states that her mother will have a ride.     Routed to provider    Does the patient meet one of the following criteria for ADS visit consideration? 16+ years old, with an [facility] PCP     TIP  Providers, please consider if this condition is appropriate for management at one of our Acute and Diagnostic Services sites.     If patient is a good candidate, please use dotphrase <dot>triageresponse and select Refer to ADS to document.     Reason for Disposition   Age > 60 years    Additional Information   Negative: Passed out (i.e., fainted, collapsed and was not responding)   Negative: Shock suspected (e.g., cold/pale/clammy skin, too weak to stand, low BP, rapid pulse)   Negative: Sounds like a life-threatening emergency to the triager   Negative: Followed an abdomen (stomach) injury   Negative: Chest pain   Negative: Abdominal pain and pregnant < 20 weeks   Negative: Abdominal pain and pregnant 20 or more weeks   Negative: " "Pain is mainly in upper abdomen (if needed ask: 'is it mainly above the belly button?')   Negative: Abdomen bloating or swelling are main symptoms   Negative: SEVERE abdominal pain (e.g., excruciating)   Negative: Vomiting red blood or black (coffee ground) material   Negative: Blood in bowel movements  (Exception: Blood on surface of BM with constipation.)   Negative: Black or tarry bowel movements  (Exception: Chronic-unchanged black-grey BMs AND is taking iron pills or Pepto-Bismol.)   Negative: MILD TO MODERATE constant pain lasting > 2 hours   Negative: Vomiting bile (green color)   Negative: Patient sounds very sick or weak to the triager   Negative: Vomiting and abdomen looks much more swollen than usual   Negative: White of the eyes have turned yellow (i.e., jaundice)   Negative: Blood in urine (red, pink, or tea-colored)   Negative: Fever > 103 F (39.4 C)   Negative: Fever > 101 F (38.3 C) and over 60 years of age   Negative: Fever > 100.0 F (37.8 C) and has diabetes mellitus or a weak immune system (e.g., HIV positive, cancer chemotherapy, organ transplant, splenectomy, chronic steroids)   Negative: Fever > 100.0 F (37.8 C) and bedridden (e.g., CVA, chronic illness, recovering from surgery)   Negative: Pregnant or could be pregnant (i.e., missed last menstrual period)    Answer Assessment - Initial Assessment Questions  1. LOCATION: \"Where does it hurt?\"       Stomach higher up.  Crampiness in lower abdominal  2. RADIATION: \"Does the pain shoot anywhere else?\" (e.g., chest, back)      no  3. ONSET: \"When did the pain begin?\" (e.g., minutes, hours or days ago)       Few months now  4. SUDDEN: \"Gradual or sudden onset?\"      After he eats something  5. PATTERN \"Does the pain come and go, or is it constant?\"     - If it comes and goes: \"How long does it last?\" \"Do you have pain now?\"      (Note: Comes and goes means the pain is intermittent. It goes away completely between bouts.)     - If constant: \"Is it " "getting better, staying the same, or getting worse?\"       (Note: Constant means the pain never goes away completely; most serious pain is constant and gets worse.)       Comes and goes  6. SEVERITY: \"How bad is the pain?\"  (e.g., Scale 1-10; mild, moderate, or severe)     - MILD (1-3): Doesn't interfere with normal activities, abdomen soft and not tender to touch.      - MODERATE (4-7): Interferes with normal activities or awakens from sleep, abdomen tender to touch.      - SEVERE (8-10): Excruciating pain, doubled over, unable to do any normal activities.        mild  7. RECURRENT SYMPTOM: \"Have you ever had this type of stomach pain before?\" If Yes, ask: \"When was the last time?\" and \"What happened that time?\"       Off and on for months  8. CAUSE: \"What do you think is causing the stomach pain?\"      fullness  9. RELIEVING/AGGRAVATING FACTORS: \"What makes it better or worse?\" (e.g., antacids, bending or twisting motion, bowel movement)      no  10. OTHER SYMPTOMS: \"Do you have any other symptoms?\" (e.g., back pain, diarrhea, fever, urination pain, vomiting)        no    Protocols used: Abdominal Pain - Female-A-[place]    "

## 2024-08-26 NOTE — TELEPHONE ENCOUNTER
Pt notified of provider message as written.  Pt verbalized good understanding.  Amara Way BSN, RN

## 2024-08-26 NOTE — TELEPHONE ENCOUNTER
Patient should take over the counter miralax today and we will discuss more at upcoming appointment. Worsening pain or new symptoms llike fevers or chills/emesis/bloody stools - to er. Guzman Perea MD

## 2024-08-27 ENCOUNTER — ANCILLARY PROCEDURE (OUTPATIENT)
Dept: GENERAL RADIOLOGY | Facility: CLINIC | Age: 89
End: 2024-08-27
Attending: FAMILY MEDICINE
Payer: COMMERCIAL

## 2024-08-27 ENCOUNTER — OFFICE VISIT (OUTPATIENT)
Dept: FAMILY MEDICINE | Facility: CLINIC | Age: 89
End: 2024-08-27
Payer: COMMERCIAL

## 2024-08-27 VITALS
RESPIRATION RATE: 16 BRPM | SYSTOLIC BLOOD PRESSURE: 119 MMHG | TEMPERATURE: 98 F | DIASTOLIC BLOOD PRESSURE: 72 MMHG | OXYGEN SATURATION: 93 % | HEART RATE: 69 BPM | BODY MASS INDEX: 17.77 KG/M2 | WEIGHT: 88 LBS

## 2024-08-27 DIAGNOSIS — R05.2 SUBACUTE COUGH: ICD-10-CM

## 2024-08-27 DIAGNOSIS — R53.83 OTHER FATIGUE: Primary | ICD-10-CM

## 2024-08-27 DIAGNOSIS — Z72.0 TOBACCO ABUSE: ICD-10-CM

## 2024-08-27 DIAGNOSIS — R10.13 ABDOMINAL PAIN, EPIGASTRIC: ICD-10-CM

## 2024-08-27 DIAGNOSIS — E46 MALNUTRITION, UNSPECIFIED TYPE (H): ICD-10-CM

## 2024-08-27 DIAGNOSIS — I10 ESSENTIAL HYPERTENSION WITH GOAL BLOOD PRESSURE LESS THAN 140/90: ICD-10-CM

## 2024-08-27 DIAGNOSIS — R68.89 COLD INTOLERANCE: ICD-10-CM

## 2024-08-27 LAB
ALBUMIN SERPL BCG-MCNC: 3.9 G/DL (ref 3.5–5.2)
ANION GAP SERPL CALCULATED.3IONS-SCNC: 9 MMOL/L (ref 7–15)
BUN SERPL-MCNC: 21.6 MG/DL (ref 8–23)
CALCIUM SERPL-MCNC: 9.6 MG/DL (ref 8.8–10.4)
CHLORIDE SERPL-SCNC: 103 MMOL/L (ref 98–107)
CREAT SERPL-MCNC: 0.65 MG/DL (ref 0.51–0.95)
EGFRCR SERPLBLD CKD-EPI 2021: 85 ML/MIN/1.73M2
ERYTHROCYTE [DISTWIDTH] IN BLOOD BY AUTOMATED COUNT: 12 % (ref 10–15)
GLUCOSE SERPL-MCNC: 98 MG/DL (ref 70–99)
HCO3 SERPL-SCNC: 29 MMOL/L (ref 22–29)
HCT VFR BLD AUTO: 45.6 % (ref 35–47)
HGB BLD-MCNC: 14.5 G/DL (ref 11.7–15.7)
MCH RBC QN AUTO: 30.7 PG (ref 26.5–33)
MCHC RBC AUTO-ENTMCNC: 31.8 G/DL (ref 31.5–36.5)
MCV RBC AUTO: 96 FL (ref 78–100)
PHOSPHATE SERPL-MCNC: 3.6 MG/DL (ref 2.5–4.5)
PLATELET # BLD AUTO: 303 10E3/UL (ref 150–450)
POTASSIUM SERPL-SCNC: 4.4 MMOL/L (ref 3.4–5.3)
RBC # BLD AUTO: 4.73 10E6/UL (ref 3.8–5.2)
SODIUM SERPL-SCNC: 141 MMOL/L (ref 135–145)
TSH SERPL DL<=0.005 MIU/L-ACNC: 1.31 UIU/ML (ref 0.3–4.2)
WBC # BLD AUTO: 6 10E3/UL (ref 4–11)

## 2024-08-27 PROCEDURE — 80069 RENAL FUNCTION PANEL: CPT | Performed by: FAMILY MEDICINE

## 2024-08-27 PROCEDURE — G2211 COMPLEX E/M VISIT ADD ON: HCPCS | Performed by: FAMILY MEDICINE

## 2024-08-27 PROCEDURE — 85027 COMPLETE CBC AUTOMATED: CPT | Performed by: FAMILY MEDICINE

## 2024-08-27 PROCEDURE — 36415 COLL VENOUS BLD VENIPUNCTURE: CPT | Performed by: FAMILY MEDICINE

## 2024-08-27 PROCEDURE — 99214 OFFICE O/P EST MOD 30 MIN: CPT | Performed by: FAMILY MEDICINE

## 2024-08-27 PROCEDURE — 71046 X-RAY EXAM CHEST 2 VIEWS: CPT | Mod: TC | Performed by: RADIOLOGY

## 2024-08-27 PROCEDURE — 74019 RADEX ABDOMEN 2 VIEWS: CPT | Mod: TC | Performed by: RADIOLOGY

## 2024-08-27 PROCEDURE — 84443 ASSAY THYROID STIM HORMONE: CPT | Performed by: FAMILY MEDICINE

## 2024-08-27 RX ORDER — LACTOSE-REDUCED FOOD
LIQUID (ML) ORAL
Qty: 18000 ML | Refills: 5 | Status: SHIPPED | OUTPATIENT
Start: 2024-08-27

## 2024-08-27 ASSESSMENT — PAIN SCALES - GENERAL: PAINLEVEL: MODERATE PAIN (4)

## 2024-08-27 NOTE — PROGRESS NOTES
SUBJECTIVE:  Isabell Cisse, a 88 year old female scheduled an appointment to discuss the following issues:  Abdominal pain. Bloating and early satiety. Some nausea but rare emesis. No black/bloody stools. No hematuria or dysuria.   Poor appetite. Remeron helps prn sleep.   Cough - chronic a little worse past couple weeks.   Running a little cold  History under weight, gait difficulty, frailty, htn, osteopenia and tobacco abuse  Isabell is living with her daughter in [place].  Patient had ct abdominal/pelvis -s/p hysterectomy/AAA  S/p  appendectomy too. Still with gallbladder.   Patient was on hospice at one point for weight loss/fatigue but improved. Daughter's/patient not interested in very aggressive treatments. Not interested in quitting smoking  Medical, social, surgical, and family histories reviewed.    ROS:      OBJECTIVE:  /72   Pulse 69   Temp 98  F (36.7  C) (Tympanic)   Resp 16   Wt 39.9 kg (88 lb)   LMP  (LMP Unknown)   SpO2 93%   Breastfeeding No   BMI 17.77 kg/m    EXAM:  GENERAL APPEARANCE: healthy, alert and no distress  EYES: EOMI,  PERRL  HENT: ear canals and TM's normal and nose and mouth without ulcers or lesions  RESP: lungs clear to auscultation - no rales, rhonchi or wheezes  CV: regular rates and rhythm, normal S1 S2, no S3 or S4 and no murmur, click or rub -  ABDOMEN:  soft, no HSM or masses and bowel sounds normal  ABDOMEN: mild tenderness to palp in epigastric area  MS: extremities normal- no gross deformities noted, no evidence of inflammation in joints, FROM in all extremities.  SKIN: no suspicious lesions or rashes  NEURO: grossly non-focal.   PSYCH: mentation appears normal and affect normal/bright    ASSESSMENT / PLAN:  (R53.83) Other fatigue  (primary encounter diagnosis)  Plan: CBC with platelets, TSH with free T4 reflex        Await labs    (R68.89) Cold intolerance  Comment: possibly thyroid  Plan: CBC with platelets, TSH with free T4 reflex        Can be  age related.     (R10.13) Abdominal pain, epigastric  Comment: gastritis vs ulcer vs hernia  Plan: CBC with platelets, XR Abdomen 2 Views, Adult         Gen Surg  Referral, omeprazole         (PRILOSEC) 20 MG DR capsule        Follow-up surgery for second opinion. To ER if a lot worse/new symptoms. Cbc ok.     (I10) Essential hypertension with goal blood pressure less than 140/90  Comment: stable  Plan: Renal panel        Follow-up wellness exam in 2months recheck.   The longitudinal plan of care for the diagnosis(es)/condition(s) as documented were addressed during this visit. Due to the added complexity in care, I will continue to support Isabell in the subsequent management and with ongoing continuity of care.    (R05.2) Subacute cough  Comment: likely mild copd. Lungs clera  Plan: XR Chest 2 Views        Prn mdi.     (Z72.0) Tobacco abuse  Plan: XR Chest 2 Views        Patient not interested in quitting    (E46) Malnutrition, unspecified type (H24)  Comment: worse  Plan: Ensure Plus Chocolate        Surgery input. Consider hospice if continue decline.  Prescription ensure shakes. Follow-up 2months.     Answers submitted by the patient for this visit:  General Questionnaire (Submitted on 8/27/2024)  Chief Complaint: Chronic problems general questions HPI Form  How many days per week do you miss taking your medication?: 0  General Concern (Submitted on 8/27/2024)  Chief Complaint: Chronic problems general questions HPI Form  What is the reason for your visit today?: Bloated tummy  When did your symptoms begin?: More than a month  What are your symptoms?: Feel overly full and bloated and low appetite weight loss and bad cough

## 2024-09-30 ENCOUNTER — MYC REFILL (OUTPATIENT)
Dept: FAMILY MEDICINE | Facility: CLINIC | Age: 89
End: 2024-09-30
Payer: COMMERCIAL

## 2024-09-30 DIAGNOSIS — I10 ESSENTIAL HYPERTENSION WITH GOAL BLOOD PRESSURE LESS THAN 140/90: ICD-10-CM

## 2024-10-01 RX ORDER — AMLODIPINE BESYLATE 5 MG/1
5 TABLET ORAL DAILY
Qty: 90 TABLET | Refills: 2 | Status: SHIPPED | OUTPATIENT
Start: 2024-10-01

## 2024-10-01 RX ORDER — LOSARTAN POTASSIUM 50 MG/1
50 TABLET ORAL DAILY
Qty: 90 TABLET | Refills: 2 | Status: SHIPPED | OUTPATIENT
Start: 2024-10-01

## 2024-11-03 DIAGNOSIS — R10.13 ABDOMINAL PAIN, EPIGASTRIC: ICD-10-CM

## 2024-11-26 ENCOUNTER — OFFICE VISIT (OUTPATIENT)
Dept: FAMILY MEDICINE | Facility: CLINIC | Age: 89
End: 2024-11-26
Attending: FAMILY MEDICINE
Payer: COMMERCIAL

## 2024-11-26 VITALS
BODY MASS INDEX: 17.94 KG/M2 | SYSTOLIC BLOOD PRESSURE: 126 MMHG | HEART RATE: 100 BPM | RESPIRATION RATE: 12 BRPM | OXYGEN SATURATION: 93 % | TEMPERATURE: 98.2 F | WEIGHT: 89 LBS | HEIGHT: 59 IN | DIASTOLIC BLOOD PRESSURE: 67 MMHG

## 2024-11-26 DIAGNOSIS — Z00.00 ENCOUNTER FOR MEDICARE ANNUAL WELLNESS EXAM: Primary | ICD-10-CM

## 2024-11-26 DIAGNOSIS — E78.5 HYPERLIPIDEMIA LDL GOAL <130: ICD-10-CM

## 2024-11-26 DIAGNOSIS — N18.1 CHRONIC KIDNEY DISEASE, STAGE 1: ICD-10-CM

## 2024-11-26 DIAGNOSIS — R00.0 TACHYCARDIA: ICD-10-CM

## 2024-11-26 DIAGNOSIS — I10 ESSENTIAL HYPERTENSION WITH GOAL BLOOD PRESSURE LESS THAN 140/90: ICD-10-CM

## 2024-11-26 PROCEDURE — 93000 ELECTROCARDIOGRAM COMPLETE: CPT | Performed by: FAMILY MEDICINE

## 2024-11-26 PROCEDURE — G0439 PPPS, SUBSEQ VISIT: HCPCS | Performed by: FAMILY MEDICINE

## 2024-11-26 PROCEDURE — 99214 OFFICE O/P EST MOD 30 MIN: CPT | Mod: 25 | Performed by: FAMILY MEDICINE

## 2024-11-26 RX ORDER — DILTIAZEM HYDROCHLORIDE EXTENDED-RELEASE TABLETS 120 MG/1
120 TABLET, EXTENDED RELEASE ORAL DAILY
Qty: 90 TABLET | Refills: 1 | Status: SHIPPED | OUTPATIENT
Start: 2024-11-26

## 2024-11-26 SDOH — HEALTH STABILITY: PHYSICAL HEALTH: ON AVERAGE, HOW MANY MINUTES DO YOU ENGAGE IN EXERCISE AT THIS LEVEL?: 0 MIN

## 2024-11-26 SDOH — HEALTH STABILITY: PHYSICAL HEALTH: ON AVERAGE, HOW MANY DAYS PER WEEK DO YOU ENGAGE IN MODERATE TO STRENUOUS EXERCISE (LIKE A BRISK WALK)?: 0 DAYS

## 2024-11-26 ASSESSMENT — SOCIAL DETERMINANTS OF HEALTH (SDOH): HOW OFTEN DO YOU GET TOGETHER WITH FRIENDS OR RELATIVES?: THREE TIMES A WEEK

## 2024-11-26 NOTE — PROGRESS NOTES
Preventive Care Visit  [facility]  Guzman Perea MD, Family Medicine  Nov 26, 2024      ASSESSMENT / PLAN:  (Z00.00) Encounter for Medicare annual wellness exam  (primary encounter diagnosis)  Comment: generally healthy and normal exam. Labs ok in summer. No falls but higher risk and good support. Memory ok  Plan: continue prn remeron. Monitor weight/appetite. Continue ensure. Recheck in 6 months    (N18.1) Chronic kidney disease, stage 1  Comment: stable  Plan: Recheck in 6 months      (R00.0) Tachycardia  Comment: likely AF- might be PAF  Plan: EKG 12-lead complete w/read - Clinics,         Echocardiogram Complete, diltiazem ER         (CARDIAZEM LA) 120 MG 24 hr tablet, Adult         Cardiology Eval  Referral        They are interested in cardiology for second opinion. Discussed pros/cons of blood thinners. Will check echo too. If chest pain or shortness of breath or cva symptoms to er.   Will change norvasc to diltizam to slow heart rate. Reveiwed risks and side effects of medication  rhinitis medicamentosa      (I10) Essential hypertension with goal blood pressure less than 140/90  Comment: stable  Plan: EKG 12-lead complete w/read - Clinics,         Echocardiogram Complete, diltiazem ER         (CARDIAZEM LA) 120 MG 24 hr tablet, Adult         Cardiology Eval  Referral        See above. Stop norvasc.      Subjective   Isabell is a 89 year old, presenting for the following:  Wellness Visit    History under weight, gait difficulty, frailty, htn, osteopenia and tobacco abuse  Isabell is living with her daughter in [place].  One other daughter and  3 sons - helpful. 8 grandkids and 2 great grandkids.  .   No falls. Appetite  - not great but improving moved 3 weeks. Sleep overall ok.   Remeron -  didn't. No selective serotonin reuptake inhibitor in past. Scales at home.   Taking ensure daily. Taking vitmainD.   No nausea, vomiting or diarrhea or  constipation. No black/bloody stools. No urine changes or hematuria.   No abdominal pain. Emotionally doing ok. GERD ok.   Home blood pressure borderline high ok. No chest pain.   No albuterol md  No falls. Memory doing ok.   Remeron - rare.       11/26/2024    10:22 AM   Additional Questions   Roomed by Evangelina   Accompanied by self, daughter Concetta         11/26/2024    10:22 AM   Patient Reported Additional Medications   Patient reports taking the following new medications n/a           HPI      Health Care Directive  Patient does not have a Health Care Directive: Discussed advance care planning with patient; however, patient declined at this time.      11/26/2024   General Health   How would you rate your overall physical health? (!) FAIR   Feel stress (tense, anxious, or unable to sleep) Only a little      (!) STRESS CONCERN      11/26/2024   Nutrition   Diet: Other   If other, please elaborate: high calorie            11/26/2024   Exercise   Days per week of moderate/strenous exercise 0 days   Average minutes spent exercising at this level 0 min      (!) EXERCISE CONCERN      11/26/2024   Social Factors   Frequency of gathering with friends or relatives Three times a week   Worry food won't last until get money to buy more No   Food not last or not have enough money for food? No   Do you have housing? (Housing is defined as stable permanent housing and does not include staying ouside in a car, in a tent, in an abandoned building, in an overnight shelter, or couch-surfing.) Yes   Are you worried about losing your housing? No   Lack of transportation? No   Unable to get utilities (heat,electricity)? No            11/26/2024   Fall Risk   Fallen 2 or more times in the past year? No     No    Trouble with walking or balance? Yes     Yes    Gait Speed Test (Document in seconds) 4.9   Gait Speed Test Interpretation Less than or equal to 5.00 seconds - PASS       Patient-reported    Multiple values from one day are  sorted in reverse-chronological order          11/26/2024   Activities of Daily Living- Home Safety   Needs help with the following daily activites Shopping    Preparing meals    Housework    Laundry    Money management   Safety concerns in the home None of the above       Multiple values from one day are sorted in reverse-chronological order         11/26/2024   Dental   Dentist two times every year? (!) NO            11/26/2024   Hearing Screening   Hearing concerns? None of the above            11/26/2024   Driving Risk Screening   Patient/family members have concerns about driving No            11/26/2024   General Alertness/Fatigue Screening   Have you been more tired than usual lately? No            11/26/2024   Urinary Incontinence Screening   Bothered by leaking urine in past 6 months No            11/26/2024   TB Screening   Were you born outside of the [place]? No            Today's PHQ-2 Score:       11/26/2024    10:21 AM   PHQ-2 ( 1999 [employer])   Q1: Little interest or pleasure in doing things 0    Q2: Feeling down, depressed or hopeless 0    PHQ-2 Score 0    Q1: Little interest or pleasure in doing things Not at all   Q2: Feeling down, depressed or hopeless Not at all   PHQ-2 Score 0       Patient-reported           11/26/2024   Substance Use   Alcohol more than 3/day or more than 7/wk No   Do you have a current opioid prescription? No   How severe/bad is pain from 1 to 10? 6/10   Do you use any other substances recreationally? No        Social History     Tobacco Use    Smoking status: Every Day     Current packs/day: 0.25     Average packs/day: 0.3 packs/day for 50.0 years (12.5 ttl pk-yrs)     Types: Cigarettes    Smokeless tobacco: Never   Vaping Use    Vaping status: Never Used   Substance Use Topics    Alcohol use: Yes     Comment: occasionally    Drug use: No                    Reviewed and updated as needed this visit by Provider                      Current providers sharing in care for this patient  "include:  Patient Care Team:  Guzman Perea MD as PCP - General (Family Medicine)  Marybeth Almazan MD as MD (Family Practice)  Guzman Perea MD as Assigned PCP    The following health maintenance items are reviewed in Epic and correct as of today:  Health Maintenance   Topic Date Due    COLORECTAL CANCER SCREENING  02/24/2016    LIPID  09/03/2020    MICROALBUMIN  09/03/2020    ANNUAL REVIEW OF HM ORDERS  11/17/2023    NICOTINE/TOBACCO CESSATION COUNSELING Q 1 YR  11/20/2024    BMP  08/27/2025    MEDICARE ANNUAL WELLNESS VISIT  11/26/2025    FALL RISK ASSESSMENT  11/26/2025    ADVANCE CARE PLANNING  11/20/2028    DTAP/TDAP/TD IMMUNIZATION (2 - Td or Tdap) 09/19/2029    DEXA  10/09/2033    PHQ-2 (once per calendar year)  Completed    INFLUENZA VACCINE  Completed    Pneumococcal Vaccine: 65+ Years  Completed    URINALYSIS  Completed    ZOSTER IMMUNIZATION  Completed    RSV VACCINE  Completed    COVID-19 Vaccine  Completed    HPV IMMUNIZATION  Aged Out    MENINGITIS IMMUNIZATION  Aged Out    RSV MONOCLONAL ANTIBODY  Aged Out            Objective    Exam  /67   Pulse 100   Temp 98.2  F (36.8  C) (Tympanic)   Resp 12   Ht 1.499 m (4' 11\")   Wt 40.4 kg (89 lb)   LMP  (LMP Unknown)   SpO2 93%   BMI 17.98 kg/m        Physical Exam  GENERAL: alert and no distress  EYES: Eyes grossly normal to inspection, PERRL and conjunctivae and sclerae normal  HENT: ear canals and TM's normal, nose and mouth without ulcers or lesions  NECK: no adenopathy, no asymmetry, masses, or scars  RESP: lungs clear to auscultation - no rales, rhonchi or wheezes  BREAST: normal without masses, tenderness or nipple discharge and no palpable axillary masses or adenopathy  CV: IRIR  ABDOMEN: soft, nontender, no hepatosplenomegaly, no masses and bowel sounds normal  MS: no gross musculoskeletal defects noted, no edema  SKIN: no suspicious lesions or rashes  NEURO: Normal strength and tone, mentation intact and speech normal  PSYCH: " mentation appears normal, affect normal/bright        11/26/2024   Mini Cog   Mini-Cog Not Completed (choose reason) Patient declines          Normal cognition based on my direct observation during interview and exam.             Signed Electronically by: Guzman Perea MD

## 2024-11-26 NOTE — PATIENT INSTRUCTIONS
Patient Education   Preventive Care Advice   This is general advice given by our system to help you stay healthy. However, your care team may have specific advice just for you. Please talk to your care team about your preventive care needs.  Nutrition  Eat 5 or more servings of fruits and vegetables each day.  Try wheat bread, brown rice and whole grain pasta (instead of white bread, rice, and pasta).  Get enough calcium and vitamin D. Check the label on foods and aim for 100% of the RDA (recommended daily allowance).  Lifestyle  Exercise at least 150 minutes each week  (30 minutes a day, 5 days a week).  Do muscle strengthening activities 2 days a week. These help control your weight and prevent disease.  No smoking.  Wear sunscreen to prevent skin cancer.  Have a dental exam and cleaning every 6 months.  Yearly exams  See your health care team every year to talk about:  Any changes in your health.  Any medicines your care team has prescribed.  Preventive care, family planning, and ways to prevent chronic diseases.  Shots (vaccines)   HPV shots (up to age 26), if you've never had them before.  Hepatitis B shots (up to age 59), if you've never had them before.  COVID-19 shot: Get this shot when it's due.  Flu shot: Get a flu shot every year.  Tetanus shot: Get a tetanus shot every 10 years.  Pneumococcal, hepatitis A, and RSV shots: Ask your care team if you need these based on your risk.  Shingles shot (for age 50 and up)  General health tests  Diabetes screening:  Starting at age 35, Get screened for diabetes at least every 3 years.  If you are younger than age 35, ask your care team if you should be screened for diabetes.  Cholesterol test: At age 39, start having a cholesterol test every 5 years, or more often if advised.  Bone density scan (DEXA): At age 50, ask your care team if you should have this scan for osteoporosis (brittle bones).  Hepatitis C: Get tested at least once in your life.  STIs (sexually  transmitted infections)  Before age 24: Ask your care team if you should be screened for STIs.  After age 24: Get screened for STIs if you're at risk. You are at risk for STIs (including HIV) if:  You are sexually active with more than one person.  You don't use condoms every time.  You or a partner was diagnosed with a sexually transmitted infection.  If you are at risk for HIV, ask about PrEP medicine to prevent HIV.  Get tested for HIV at least once in your life, whether you are at risk for HIV or not.  Cancer screening tests  Cervical cancer screening: If you have a cervix, begin getting regular cervical cancer screening tests starting at age 21.  Breast cancer scan (mammogram): If you've ever had breasts, begin having regular mammograms starting at age 40. This is a scan to check for breast cancer.  Colon cancer screening: It is important to start screening for colon cancer at age 45.  Have a colonoscopy test every 10 years (or more often if you're at risk) Or, ask your provider about stool tests like a FIT test every year or Cologuard test every 3 years.  To learn more about your testing options, visit:   .  For help making a decision, visit:   https://bit.ly/ud81321.  Prostate cancer screening test: If you have a prostate, ask your care team if a prostate cancer screening test (PSA) at age 55 is right for you.  Lung cancer screening: If you are a current or former smoker ages 50 to 80, ask your care team if ongoing lung cancer screenings are right for you.  For informational purposes only. Not to replace the advice of your health care provider. Copyright   2023 [facility]. All rights reserved. Clinically reviewed by the [facility] Transitions Program. [employer] [id number] - REV 01/24.

## 2025-02-24 ENCOUNTER — MYC MEDICAL ADVICE (OUTPATIENT)
Dept: FAMILY MEDICINE | Facility: CLINIC | Age: 89
End: 2025-02-24
Payer: COMMERCIAL

## 2025-02-24 NOTE — TELEPHONE ENCOUNTER
Ok video. Any paperwork the might have given to me before appointment would be needed. Guzman Perea MD

## 2025-02-24 NOTE — TELEPHONE ENCOUNTER
You do not have any openings until 3/18/25. Can we use a same day or approval spot?Macrina Mora [facility]

## 2025-02-25 ENCOUNTER — VIRTUAL VISIT (OUTPATIENT)
Dept: FAMILY MEDICINE | Facility: CLINIC | Age: 89
End: 2025-02-25
Payer: COMMERCIAL

## 2025-02-25 DIAGNOSIS — R62.7 FAILURE TO THRIVE IN ADULT: ICD-10-CM

## 2025-02-25 DIAGNOSIS — R63.4 WEIGHT LOSS, UNINTENTIONAL: Primary | ICD-10-CM

## 2025-02-25 DIAGNOSIS — R10.13 ABDOMINAL PAIN, EPIGASTRIC: ICD-10-CM

## 2025-02-25 DIAGNOSIS — R06.02 SOB (SHORTNESS OF BREATH): ICD-10-CM

## 2025-02-25 DIAGNOSIS — R11.0 NAUSEA: ICD-10-CM

## 2025-02-25 PROCEDURE — 98006 SYNCH AUDIO-VIDEO EST MOD 30: CPT | Performed by: FAMILY MEDICINE

## 2025-02-25 RX ORDER — ONDANSETRON 4 MG/1
4 TABLET, ORALLY DISINTEGRATING ORAL EVERY 8 HOURS PRN
Qty: 30 TABLET | Refills: 1 | Status: SHIPPED | OUTPATIENT
Start: 2025-02-25

## 2025-02-25 RX ORDER — FAMOTIDINE 40 MG/1
40 TABLET, FILM COATED ORAL DAILY
Qty: 90 TABLET | Refills: 1 | Status: SHIPPED | OUTPATIENT
Start: 2025-02-25

## 2025-02-25 NOTE — PROGRESS NOTES
Isabell is a 89 year old who is being evaluated via a billable video visit.    How would you like to obtain your AVS? MyChart  If the video visit is dropped, the invitation should be resent by: Text to cell phone: [phone]   Will anyone else be joining your video visit? No      ASSESSMENT / PLAN:  (R63.4) Weight loss, unintentional  (primary encounter diagnosis)  Comment: worse.  Possibly occult cancer or GI related  Plan: continue montior. Hospice referral.     (R06.02) SOB (shortness of breath)  Comment: copd vs ?  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 Good support with family  Plan: Hospice Referral        Patient and family interested in hospice and not aggressive work up or treatments.     (Z68.1) BMI < 18.5  Comment: worse  Plan: Hospice Referral            (R11.0) Nausea  Comment: see above  Plan: ondansetron (ZOFRAN ODT) 4 MG ODT tab        Reveiwed risks and side effects of medication      (R10.13) Abdominal pain, epigastric  Comment: ulcer vs ?  Plan: famotidine (PEPCID) 40 MG tablet        Patient not interested in labs or imaging at this point. Reveiwed risks and side effects of medication  Add pepcid.        Subjective   Isabell is a 89 year old, presenting for the following health issues:    Interested in hospice- RN seen at [facility] 2 weeks. Don't qualify for hospice at this point.  They would like albumin.   83 lbs. Temperature 96.8 and blood pressure 127/77.   Poor appetite. Nauseated. Abdominal pain.   Worsening shortness of breath and cough.   No emesis. Fluids ok.  No urine changes or hematuria. No black/bloody stools. No history ulcers. Advil plus for pain not daily. Caffeine - cutting down 1/2 cup/day. Some spite. No ALCOHOL.   History under weight, gait difficulty, frailty, htn, osteopenia and tobacco abuse  Isabell is living with her daughter in [place].  One other daughter and  3 sons - helpful. 8 grandkids and 2 great  grandkids.  .   Hospice - [facility] like [facility]  [facility]      2/25/2025     9:04 AM   Additional Questions   Roomed by Evangelina   Accompanied by self         2/25/2025     9:04 AM   Patient Reported Additional Medications   Patient reports taking the following new medications n/a     Video Start Time: 10:01 AM    History of Present Illness       Reason for visit:  Trouble walking and being upright.  Lack of eating.  Symptom onset:  3-7 days ago  Symptoms include:  Heavy breathing.  Dizziness and nausea.  Sleepy.  No appetite.  Symptom intensity:  Severe  Symptom progression:  Worsening  Had these symptoms before:  No  What makes it worse:  No  What makes it better:  No   She is taking medications regularly.                   Objective           Vitals:  No vitals were obtained today due to virtual visit.    Physical Exam   GENERAL: alert, pale, frail, elderly, and fatigued  EYES: Eyes grossly normal to inspection.  No discharge or erythema, or obvious scleral/conjunctival abnormalities.  RESP: some coughing during interview  SKIN: Visible skin clear. No significant rash, abnormal pigmentation or lesions.  NEURO: Cranial nerves grossly intact.  Mentation slow  PSYCH: a little flat          Video-Visit Details    Type of service:  Video Visit   Video End Time:10:17 AM  Originating Location (pt. Location): Home    Distant Location (provider location):  On-site  Platform used for Video Visit: Rylee  Signed Electronically by: Guzman Perea MD

## (undated) DEVICE — NDL SPINAL 25GA 3.5" QUINCKE 405180

## (undated) DEVICE — TRAY PAIN INJECTION 97A 640

## (undated) DEVICE — DRSG PRIMAPORE 03 1/8X6" 66000318

## (undated) DEVICE — GLOVE PROTEXIS POWDER FREE SMT 8.0  2D72PT80X

## (undated) DEVICE — CANNULA MONOPOLAR CVD 100ML 20GA 0406-630-125

## (undated) DEVICE — PREP CHLORAPREP W/ORANGE TINT 10.5ML 260715

## (undated) RX ORDER — DIAZEPAM 5 MG
TABLET ORAL
Status: DISPENSED
Start: 2020-06-08